# Patient Record
Sex: MALE | Race: OTHER | NOT HISPANIC OR LATINO | URBAN - METROPOLITAN AREA
[De-identification: names, ages, dates, MRNs, and addresses within clinical notes are randomized per-mention and may not be internally consistent; named-entity substitution may affect disease eponyms.]

---

## 2017-06-13 ENCOUNTER — OUTPATIENT (OUTPATIENT)
Dept: OUTPATIENT SERVICES | Facility: HOSPITAL | Age: 67
LOS: 1 days | Discharge: HOME | End: 2017-06-13

## 2017-06-13 DIAGNOSIS — I63.9 CEREBRAL INFARCTION, UNSPECIFIED: ICD-10-CM

## 2017-06-28 DIAGNOSIS — M25.572 PAIN IN LEFT ANKLE AND JOINTS OF LEFT FOOT: ICD-10-CM

## 2017-07-05 ENCOUNTER — OUTPATIENT (OUTPATIENT)
Dept: OUTPATIENT SERVICES | Facility: HOSPITAL | Age: 67
LOS: 1 days | Discharge: HOME | End: 2017-07-05

## 2017-07-05 DIAGNOSIS — R52 PAIN, UNSPECIFIED: ICD-10-CM

## 2017-07-05 DIAGNOSIS — I63.9 CEREBRAL INFARCTION, UNSPECIFIED: ICD-10-CM

## 2017-07-27 ENCOUNTER — OUTPATIENT (OUTPATIENT)
Dept: OUTPATIENT SERVICES | Facility: HOSPITAL | Age: 67
LOS: 1 days | Discharge: HOME | End: 2017-07-27

## 2017-07-27 DIAGNOSIS — R93.8 ABNORMAL FINDINGS ON DIAGNOSTIC IMAGING OF OTHER SPECIFIED BODY STRUCTURES: ICD-10-CM

## 2017-07-27 DIAGNOSIS — I63.9 CEREBRAL INFARCTION, UNSPECIFIED: ICD-10-CM

## 2018-01-03 ENCOUNTER — OUTPATIENT (OUTPATIENT)
Dept: OUTPATIENT SERVICES | Facility: HOSPITAL | Age: 68
LOS: 1 days | Discharge: HOME | End: 2018-01-03

## 2018-01-03 DIAGNOSIS — R49.8 OTHER VOICE AND RESONANCE DISORDERS: ICD-10-CM

## 2018-01-03 DIAGNOSIS — I63.9 CEREBRAL INFARCTION, UNSPECIFIED: ICD-10-CM

## 2018-04-04 ENCOUNTER — TRANSCRIPTION ENCOUNTER (OUTPATIENT)
Age: 68
End: 2018-04-04

## 2018-12-28 ENCOUNTER — OUTPATIENT (OUTPATIENT)
Dept: OUTPATIENT SERVICES | Facility: HOSPITAL | Age: 68
LOS: 1 days | Discharge: HOME | End: 2018-12-28

## 2018-12-28 DIAGNOSIS — R97.0 ELEVATED CARCINOEMBRYONIC ANTIGEN [CEA]: ICD-10-CM

## 2019-03-29 ENCOUNTER — OUTPATIENT (OUTPATIENT)
Dept: OUTPATIENT SERVICES | Facility: HOSPITAL | Age: 69
LOS: 1 days | Discharge: HOME | End: 2019-03-29

## 2019-03-29 DIAGNOSIS — R97.20 ELEVATED PROSTATE SPECIFIC ANTIGEN [PSA]: ICD-10-CM

## 2019-08-23 ENCOUNTER — OUTPATIENT (OUTPATIENT)
Dept: OUTPATIENT SERVICES | Facility: HOSPITAL | Age: 69
LOS: 1 days | Discharge: HOME | End: 2019-08-23

## 2019-08-23 DIAGNOSIS — R97.20 ELEVATED PROSTATE SPECIFIC ANTIGEN [PSA]: ICD-10-CM

## 2020-12-22 ENCOUNTER — OUTPATIENT (OUTPATIENT)
Dept: OUTPATIENT SERVICES | Facility: HOSPITAL | Age: 70
LOS: 1 days | Discharge: HOME | End: 2020-12-22
Payer: MEDICAID

## 2020-12-22 DIAGNOSIS — M25.511 PAIN IN RIGHT SHOULDER: ICD-10-CM

## 2020-12-22 PROCEDURE — 73030 X-RAY EXAM OF SHOULDER: CPT | Mod: 26,RT

## 2021-04-26 ENCOUNTER — TRANSCRIPTION ENCOUNTER (OUTPATIENT)
Age: 71
End: 2021-04-26

## 2021-11-07 ENCOUNTER — TRANSCRIPTION ENCOUNTER (OUTPATIENT)
Age: 71
End: 2021-11-07

## 2022-09-30 ENCOUNTER — OUTPATIENT (OUTPATIENT)
Dept: OUTPATIENT SERVICES | Facility: HOSPITAL | Age: 72
LOS: 1 days | Discharge: HOME | End: 2022-09-30

## 2022-09-30 DIAGNOSIS — M25.561 PAIN IN RIGHT KNEE: ICD-10-CM

## 2022-09-30 DIAGNOSIS — M25.511 PAIN IN RIGHT SHOULDER: ICD-10-CM

## 2022-09-30 PROCEDURE — 73721 MRI JNT OF LWR EXTRE W/O DYE: CPT | Mod: 26,RT

## 2022-09-30 PROCEDURE — 73221 MRI JOINT UPR EXTREM W/O DYE: CPT | Mod: 26,RT

## 2023-02-12 ENCOUNTER — INPATIENT (INPATIENT)
Facility: HOSPITAL | Age: 73
LOS: 3 days | Discharge: HOME | DRG: 554 | End: 2023-02-16
Attending: INTERNAL MEDICINE | Admitting: INTERNAL MEDICINE
Payer: MEDICARE

## 2023-02-12 VITALS
DIASTOLIC BLOOD PRESSURE: 60 MMHG | TEMPERATURE: 96 F | WEIGHT: 175.05 LBS | RESPIRATION RATE: 18 BRPM | SYSTOLIC BLOOD PRESSURE: 130 MMHG | OXYGEN SATURATION: 96 % | HEART RATE: 80 BPM

## 2023-02-12 DIAGNOSIS — M00.9 PYOGENIC ARTHRITIS, UNSPECIFIED: ICD-10-CM

## 2023-02-12 LAB
ALBUMIN SERPL ELPH-MCNC: 4 G/DL — SIGNIFICANT CHANGE UP (ref 3.5–5.2)
ALP SERPL-CCNC: 62 U/L — SIGNIFICANT CHANGE UP (ref 30–115)
ALT FLD-CCNC: 10 U/L — SIGNIFICANT CHANGE UP (ref 0–41)
ANION GAP SERPL CALC-SCNC: 15 MMOL/L — HIGH (ref 7–14)
APPEARANCE UR: CLEAR — SIGNIFICANT CHANGE UP
APTT BLD: 27.3 SEC — SIGNIFICANT CHANGE UP (ref 27–39.2)
AST SERPL-CCNC: 30 U/L — SIGNIFICANT CHANGE UP (ref 0–41)
B PERT IGG+IGM PNL SER: ABNORMAL
BACTERIA # UR AUTO: NEGATIVE — SIGNIFICANT CHANGE UP
BASOPHILS # BLD AUTO: 0.03 K/UL — SIGNIFICANT CHANGE UP (ref 0–0.2)
BASOPHILS NFR BLD AUTO: 0.3 % — SIGNIFICANT CHANGE UP (ref 0–1)
BILIRUB SERPL-MCNC: 1.7 MG/DL — HIGH (ref 0.2–1.2)
BILIRUB UR-MCNC: NEGATIVE — SIGNIFICANT CHANGE UP
BUN SERPL-MCNC: 13 MG/DL — SIGNIFICANT CHANGE UP (ref 10–20)
CALCIUM SERPL-MCNC: 9.3 MG/DL — SIGNIFICANT CHANGE UP (ref 8.4–10.5)
CHLORIDE SERPL-SCNC: 94 MMOL/L — LOW (ref 98–110)
CO2 SERPL-SCNC: 25 MMOL/L — SIGNIFICANT CHANGE UP (ref 17–32)
COLOR FLD: YELLOW — SIGNIFICANT CHANGE UP
COLOR SPEC: YELLOW — SIGNIFICANT CHANGE UP
CREAT SERPL-MCNC: 0.9 MG/DL — SIGNIFICANT CHANGE UP (ref 0.7–1.5)
CRP SERPL-MCNC: 229 MG/L — HIGH
DIFF PNL FLD: ABNORMAL
EGFR: 91 ML/MIN/1.73M2 — SIGNIFICANT CHANGE UP
EOSINOPHIL # BLD AUTO: 0.03 K/UL — SIGNIFICANT CHANGE UP (ref 0–0.7)
EOSINOPHIL NFR BLD AUTO: 0.3 % — SIGNIFICANT CHANGE UP (ref 0–8)
EPI CELLS # UR: 2 /HPF — SIGNIFICANT CHANGE UP (ref 0–5)
ERYTHROCYTE [SEDIMENTATION RATE] IN BLOOD: 45 MM/HR — HIGH (ref 0–10)
FLUAV AG NPH QL: SIGNIFICANT CHANGE UP
FLUBV AG NPH QL: SIGNIFICANT CHANGE UP
FLUID INTAKE SUBSTANCE CLASS: SIGNIFICANT CHANGE UP
GLUCOSE FLD-MCNC: 118 MG/DL — SIGNIFICANT CHANGE UP
GLUCOSE SERPL-MCNC: 163 MG/DL — HIGH (ref 70–99)
GLUCOSE UR QL: NEGATIVE — SIGNIFICANT CHANGE UP
GRAM STN FLD: SIGNIFICANT CHANGE UP
HCT VFR BLD CALC: 43.1 % — SIGNIFICANT CHANGE UP (ref 42–52)
HGB BLD-MCNC: 15 G/DL — SIGNIFICANT CHANGE UP (ref 14–18)
HYALINE CASTS # UR AUTO: 2 /LPF — SIGNIFICANT CHANGE UP (ref 0–7)
IMM GRANULOCYTES NFR BLD AUTO: 0.4 % — HIGH (ref 0.1–0.3)
INR BLD: 1.08 RATIO — SIGNIFICANT CHANGE UP (ref 0.65–1.3)
KETONES UR-MCNC: SIGNIFICANT CHANGE UP
LACTATE SERPL-SCNC: 1.8 MMOL/L — SIGNIFICANT CHANGE UP (ref 0.7–2)
LEUKOCYTE ESTERASE UR-ACNC: NEGATIVE — SIGNIFICANT CHANGE UP
LYMPHOCYTES # BLD AUTO: 1.44 K/UL — SIGNIFICANT CHANGE UP (ref 1.2–3.4)
LYMPHOCYTES # BLD AUTO: 15.5 % — LOW (ref 20.5–51.1)
MCHC RBC-ENTMCNC: 30 PG — SIGNIFICANT CHANGE UP (ref 27–31)
MCHC RBC-ENTMCNC: 34.8 G/DL — SIGNIFICANT CHANGE UP (ref 32–37)
MCV RBC AUTO: 86.2 FL — SIGNIFICANT CHANGE UP (ref 80–94)
MONOCYTES # BLD AUTO: 1.06 K/UL — HIGH (ref 0.1–0.6)
MONOCYTES NFR BLD AUTO: 11.4 % — HIGH (ref 1.7–9.3)
MONOS+MACROS # FLD: 10 % — SIGNIFICANT CHANGE UP
NEUTROPHILS # BLD AUTO: 6.72 K/UL — HIGH (ref 1.4–6.5)
NEUTROPHILS NFR BLD AUTO: 72.1 % — SIGNIFICANT CHANGE UP (ref 42.2–75.2)
NEUTROPHILS-BODY FLUID: 90 % — SIGNIFICANT CHANGE UP
NITRITE UR-MCNC: NEGATIVE — SIGNIFICANT CHANGE UP
NRBC # BLD: 0 /100 WBCS — SIGNIFICANT CHANGE UP (ref 0–0)
PH UR: 6.5 — SIGNIFICANT CHANGE UP (ref 5–8)
PLATELET # BLD AUTO: 172 K/UL — SIGNIFICANT CHANGE UP (ref 130–400)
POTASSIUM SERPL-MCNC: 4 MMOL/L — SIGNIFICANT CHANGE UP (ref 3.5–5)
POTASSIUM SERPL-SCNC: 4 MMOL/L — SIGNIFICANT CHANGE UP (ref 3.5–5)
PROT FLD-MCNC: 3.6 G/DL — SIGNIFICANT CHANGE UP
PROT SERPL-MCNC: 6.7 G/DL — SIGNIFICANT CHANGE UP (ref 6–8)
PROT UR-MCNC: ABNORMAL
PROTHROM AB SERPL-ACNC: 12.3 SEC — SIGNIFICANT CHANGE UP (ref 9.95–12.87)
RBC # BLD: 5 M/UL — SIGNIFICANT CHANGE UP (ref 4.7–6.1)
RBC # FLD: 12.3 % — SIGNIFICANT CHANGE UP (ref 11.5–14.5)
RBC CASTS # UR COMP ASSIST: 18 /HPF — HIGH (ref 0–4)
RCV VOL RI: 7000 /UL — HIGH (ref 0–0)
RSV RNA NPH QL NAA+NON-PROBE: SIGNIFICANT CHANGE UP
SARS-COV-2 RNA SPEC QL NAA+PROBE: SIGNIFICANT CHANGE UP
SODIUM SERPL-SCNC: 134 MMOL/L — LOW (ref 135–146)
SP GR SPEC: 1.03 — SIGNIFICANT CHANGE UP (ref 1.01–1.03)
TOTAL NUCLEATED CELL COUNT, BODY FLUID: SIGNIFICANT CHANGE UP /UL
TUBE TYPE: SIGNIFICANT CHANGE UP
UROBILINOGEN FLD QL: ABNORMAL
WBC # BLD: 9.32 K/UL — SIGNIFICANT CHANGE UP (ref 4.8–10.8)
WBC # FLD AUTO: 9.32 K/UL — SIGNIFICANT CHANGE UP (ref 4.8–10.8)
WBC UR QL: 6 /HPF — HIGH (ref 0–5)

## 2023-02-12 PROCEDURE — 84100 ASSAY OF PHOSPHORUS: CPT

## 2023-02-12 PROCEDURE — 80053 COMPREHEN METABOLIC PANEL: CPT

## 2023-02-12 PROCEDURE — 86803 HEPATITIS C AB TEST: CPT

## 2023-02-12 PROCEDURE — 73552 X-RAY EXAM OF FEMUR 2/>: CPT | Mod: 26,RT

## 2023-02-12 PROCEDURE — 71045 X-RAY EXAM CHEST 1 VIEW: CPT | Mod: 26

## 2023-02-12 PROCEDURE — 36415 COLL VENOUS BLD VENIPUNCTURE: CPT

## 2023-02-12 PROCEDURE — 84550 ASSAY OF BLOOD/URIC ACID: CPT

## 2023-02-12 PROCEDURE — 80202 ASSAY OF VANCOMYCIN: CPT

## 2023-02-12 PROCEDURE — 83036 HEMOGLOBIN GLYCOSYLATED A1C: CPT

## 2023-02-12 PROCEDURE — 80061 LIPID PANEL: CPT

## 2023-02-12 PROCEDURE — 84443 ASSAY THYROID STIM HORMONE: CPT

## 2023-02-12 PROCEDURE — 82962 GLUCOSE BLOOD TEST: CPT

## 2023-02-12 PROCEDURE — 83735 ASSAY OF MAGNESIUM: CPT

## 2023-02-12 PROCEDURE — 85025 COMPLETE CBC W/AUTO DIFF WBC: CPT

## 2023-02-12 PROCEDURE — 97162 PT EVAL MOD COMPLEX 30 MIN: CPT | Mod: GP

## 2023-02-12 PROCEDURE — 93010 ELECTROCARDIOGRAM REPORT: CPT

## 2023-02-12 PROCEDURE — 99223 1ST HOSP IP/OBS HIGH 75: CPT

## 2023-02-12 PROCEDURE — 85730 THROMBOPLASTIN TIME PARTIAL: CPT

## 2023-02-12 PROCEDURE — 85027 COMPLETE CBC AUTOMATED: CPT

## 2023-02-12 PROCEDURE — 73562 X-RAY EXAM OF KNEE 3: CPT | Mod: 26,RT

## 2023-02-12 PROCEDURE — 73590 X-RAY EXAM OF LOWER LEG: CPT | Mod: 26,RT

## 2023-02-12 PROCEDURE — 93306 TTE W/DOPPLER COMPLETE: CPT

## 2023-02-12 PROCEDURE — 86140 C-REACTIVE PROTEIN: CPT

## 2023-02-12 RX ORDER — TAMSULOSIN HYDROCHLORIDE 0.4 MG/1
0.4 CAPSULE ORAL AT BEDTIME
Refills: 0 | Status: DISCONTINUED | OUTPATIENT
Start: 2023-02-12 | End: 2023-02-16

## 2023-02-12 RX ORDER — FINASTERIDE 5 MG/1
5 TABLET, FILM COATED ORAL DAILY
Refills: 0 | Status: DISCONTINUED | OUTPATIENT
Start: 2023-02-12 | End: 2023-02-16

## 2023-02-12 RX ORDER — BACLOFEN 100 %
5 POWDER (GRAM) MISCELLANEOUS EVERY 8 HOURS
Refills: 0 | Status: DISCONTINUED | OUTPATIENT
Start: 2023-02-12 | End: 2023-02-14

## 2023-02-12 RX ORDER — CARBIDOPA AND LEVODOPA 25; 100 MG/1; MG/1
1 TABLET ORAL THREE TIMES A DAY
Refills: 0 | Status: DISCONTINUED | OUTPATIENT
Start: 2023-02-12 | End: 2023-02-14

## 2023-02-12 RX ORDER — DULOXETINE HYDROCHLORIDE 30 MG/1
30 CAPSULE, DELAYED RELEASE ORAL DAILY
Refills: 0 | Status: DISCONTINUED | OUTPATIENT
Start: 2023-02-12 | End: 2023-02-16

## 2023-02-12 RX ORDER — OXYBUTYNIN CHLORIDE 5 MG
5 TABLET ORAL THREE TIMES A DAY
Refills: 0 | Status: DISCONTINUED | OUTPATIENT
Start: 2023-02-12 | End: 2023-02-16

## 2023-02-12 RX ORDER — VANCOMYCIN HCL 1 G
1000 VIAL (EA) INTRAVENOUS ONCE
Refills: 0 | Status: COMPLETED | OUTPATIENT
Start: 2023-02-12 | End: 2023-02-12

## 2023-02-12 RX ORDER — SODIUM CHLORIDE 9 MG/ML
1000 INJECTION, SOLUTION INTRAVENOUS
Refills: 0 | Status: DISCONTINUED | OUTPATIENT
Start: 2023-02-12 | End: 2023-02-13

## 2023-02-12 RX ORDER — PIPERACILLIN AND TAZOBACTAM 4; .5 G/20ML; G/20ML
3.38 INJECTION, POWDER, LYOPHILIZED, FOR SOLUTION INTRAVENOUS ONCE
Refills: 0 | Status: COMPLETED | OUTPATIENT
Start: 2023-02-12 | End: 2023-02-12

## 2023-02-12 RX ORDER — ATORVASTATIN CALCIUM 80 MG/1
20 TABLET, FILM COATED ORAL AT BEDTIME
Refills: 0 | Status: DISCONTINUED | OUTPATIENT
Start: 2023-02-12 | End: 2023-02-16

## 2023-02-12 RX ORDER — ENOXAPARIN SODIUM 100 MG/ML
40 INJECTION SUBCUTANEOUS EVERY 24 HOURS
Refills: 0 | Status: DISCONTINUED | OUTPATIENT
Start: 2023-02-12 | End: 2023-02-16

## 2023-02-12 RX ORDER — KETOROLAC TROMETHAMINE 30 MG/ML
30 SYRINGE (ML) INJECTION ONCE
Refills: 0 | Status: DISCONTINUED | OUTPATIENT
Start: 2023-02-12 | End: 2023-02-12

## 2023-02-12 RX ORDER — PANTOPRAZOLE SODIUM 20 MG/1
40 TABLET, DELAYED RELEASE ORAL
Refills: 0 | Status: DISCONTINUED | OUTPATIENT
Start: 2023-02-12 | End: 2023-02-16

## 2023-02-12 RX ORDER — OXYCODONE AND ACETAMINOPHEN 5; 325 MG/1; MG/1
2 TABLET ORAL ONCE
Refills: 0 | Status: DISCONTINUED | OUTPATIENT
Start: 2023-02-12 | End: 2023-02-12

## 2023-02-12 RX ORDER — SODIUM CHLORIDE 9 MG/ML
1000 INJECTION, SOLUTION INTRAVENOUS ONCE
Refills: 0 | Status: COMPLETED | OUTPATIENT
Start: 2023-02-12 | End: 2023-02-12

## 2023-02-12 RX ORDER — ROPINIROLE 8 MG/1
1 TABLET, FILM COATED, EXTENDED RELEASE ORAL THREE TIMES A DAY
Refills: 0 | Status: DISCONTINUED | OUTPATIENT
Start: 2023-02-12 | End: 2023-02-16

## 2023-02-12 RX ORDER — ATENOLOL 25 MG/1
100 TABLET ORAL DAILY
Refills: 0 | Status: DISCONTINUED | OUTPATIENT
Start: 2023-02-12 | End: 2023-02-16

## 2023-02-12 RX ADMIN — OXYCODONE AND ACETAMINOPHEN 2 TABLET(S): 5; 325 TABLET ORAL at 12:04

## 2023-02-12 RX ADMIN — ROPINIROLE 1 MILLIGRAM(S): 8 TABLET, FILM COATED, EXTENDED RELEASE ORAL at 22:29

## 2023-02-12 RX ADMIN — CARBIDOPA AND LEVODOPA 1 TABLET(S): 25; 100 TABLET ORAL at 22:29

## 2023-02-12 RX ADMIN — Medication 250 MILLIGRAM(S): at 13:56

## 2023-02-12 RX ADMIN — ATORVASTATIN CALCIUM 20 MILLIGRAM(S): 80 TABLET, FILM COATED ORAL at 22:29

## 2023-02-12 RX ADMIN — Medication 5 MILLIGRAM(S): at 22:28

## 2023-02-12 RX ADMIN — TAMSULOSIN HYDROCHLORIDE 0.4 MILLIGRAM(S): 0.4 CAPSULE ORAL at 22:28

## 2023-02-12 RX ADMIN — PIPERACILLIN AND TAZOBACTAM 200 GRAM(S): 4; .5 INJECTION, POWDER, LYOPHILIZED, FOR SOLUTION INTRAVENOUS at 13:56

## 2023-02-12 RX ADMIN — Medication 5 MILLIGRAM(S): at 22:29

## 2023-02-12 RX ADMIN — SODIUM CHLORIDE 1000 MILLILITER(S): 9 INJECTION, SOLUTION INTRAVENOUS at 10:38

## 2023-02-12 RX ADMIN — Medication 30 MILLIGRAM(S): at 10:38

## 2023-02-12 RX ADMIN — SODIUM CHLORIDE 75 MILLILITER(S): 9 INJECTION, SOLUTION INTRAVENOUS at 18:32

## 2023-02-12 NOTE — H&P ADULT - NSHPREVIEWOFSYSTEMS_GEN_ALL_CORE
See HPI Review of Systems:  •	CONSTITUTIONAL - No fever  •	SKIN - No rash  •	HEMATOLOGIC - No abnormal bleeding or bruising  •	RESPIRATORY - No shortness of breath, No cough  •	CARDIAC -No chest pain  •	GI - No abdominal pain  •                 - No dysuria   •	ENDO - No polydypsia, No polyuria, No heat/cold intolerance  •	MUSCULOSKELETAL - Right knee Pain s/p Arthrocentesis   All other systems negative, unless specified in HPI

## 2023-02-12 NOTE — CONSULT NOTE ADULT - SUBJECTIVE AND OBJECTIVE BOX
Orthopaedic Surgery Consult Note    74yo Male presents to ED with right knee pain after injection 4 days prior. No fevers or chills. Pain localized to right knee. No pain elsewhere. No numbness or tingling of the extremities. Knee aspiration performed by ED, cell count <50k    PMH/PSH  PAIN  Ambulatory dysfunction    Medications  atenolol  Tablet 100 milliGRAM(s) Oral daily  atorvastatin 20 milliGRAM(s) Oral at bedtime  baclofen 5 milliGRAM(s) Oral every 8 hours  carbidopa/levodopa  25/250 1 Tablet(s) Oral three times a day  DULoxetine 30 milliGRAM(s) Oral daily  enoxaparin Injectable 40 milliGRAM(s) SubCutaneous every 24 hours  finasteride 5 milliGRAM(s) Oral daily  hydrochlorothiazide 12.5 milliGRAM(s) Oral daily  lactated ringers. 1000 milliLiter(s) IV Continuous <Continuous>  oxybutynin 5 milliGRAM(s) Oral three times a day  pantoprazole    Tablet 40 milliGRAM(s) Oral before breakfast  rOPINIRole 1 milliGRAM(s) Oral three times a day  tamsulosin 0.4 milliGRAM(s) Oral at bedtime    Home Medications:  atenolol 100 mg oral tablet: 1 tab(s) orally once a day (12 Feb 2023 15:44)  atorvastatin 20 mg oral tablet: 1 tab(s) orally once a day (12 Feb 2023 15:44)  baclofen 20 mg oral tablet: 1 tab(s) orally 3 times a day (12 Feb 2023 15:44)  DULoxetine 30 mg oral delayed release capsule: 1 cap(s) orally 2 times a day (12 Feb 2023 15:45)  finasteride 5 mg oral tablet: 1 tab(s) orally once a day (12 Feb 2023 15:45)  hydroCHLOROthiazide 12.5 mg oral tablet: 1 tab(s) orally once a day (12 Feb 2023 15:46)  omeprazole 20 mg oral delayed release capsule: 1 cap(s) orally once a day (12 Feb 2023 15:46)  oxybutynin 5 mg oral tablet: 1 tab(s) orally 3 times a day (12 Feb 2023 15:46)  rOPINIRole 1 mg oral tablet: 1 tab(s) orally 3 times a day (12 Feb 2023 15:46)  Sinemet 25 mg-250 mg oral tablet: 1 tab(s) orally 3 times a day (12 Feb 2023 15:45)  tamsulosin 0.4 mg oral capsule: 1 cap(s) orally once a day (12 Feb 2023 15:46)  tiZANidine 2 mg oral tablet: 0.5 tab(s) orally every 8 hours (12 Feb 2023 15:47)    Allergies  No Known Drug Allergies    T(C): 36.8 (02-12-23 @ 22:05), Max: 36.9 (02-12-23 @ 18:29)  HR: 74 (02-12-23 @ 22:05) (68 - 80)  BP: 133/67 (02-12-23 @ 22:05) (122/64 - 133/67)  RR: 18 (02-12-23 @ 22:05) (18 - 19)  SpO2: 97% (02-12-23 @ 18:29) (96% - 97%)    P/E:  AOx3, NAD  Non-labored breathing    RLE  Skin intact  Minimal knee swelling  No erythema/ecchymosis noted  No deformity/laceration/abrasion noted  ROM 5-90, no pain with short arc ROM or micromotion  TTP anterior knee, no TTP medial/lateral joint line  Compartments soft and compressible, no pain w/ passive stretch of digits  SILT SP/DP/T/SURAL/  Firing TA/EHL/FHL/GS  DP pulse 2+, cap refill <2        Labs                        15.0   9.32  )-----------( 172      ( 12 Feb 2023 11:16 )             43.1     02-12    134<L>  |  94<L>  |  13  ----------------------------<  163<H>  4.0   |  25  |  0.9    Ca    9.3      12 Feb 2023 11:16    TPro  6.7  /  Alb  4.0  /  TBili  1.7<H>  /  DBili  x   /  AST  30  /  ALT  10  /  AlkPhos  62  02-12    LIVER FUNCTIONS - ( 12 Feb 2023 11:16 )  Alb: 4.0 g/dL / Pro: 6.7 g/dL / ALK PHOS: 62 U/L / ALT: 10 U/L / AST: 30 U/L / GGT: x           PT/INR - ( 12 Feb 2023 11:16 )   PT: 12.30 sec;   INR: 1.08 ratio         PTT - ( 12 Feb 2023 11:16 )  PTT:27.3 sec    Imaging: XR demonstrating R knee degenerative changes    A/P:  74yo Male w/ right knee pain/inflammation, possible gout vs. inflammatory arthropathy. Cell count 38,481, no organisms on gram stain. Pain much improved after aspiration and abx. Low clinical suspicion for septic joint; no acute orthopedic surgical intervention, will continue to monitor, may consider serial aspiration    Weight bearing: WBAT RLE  Pain control  Ice/elevation  Diet as tolerated  DVT ppx  Abx per ID  OOBTC  Home medications  Monitor labs  Follow up knee aspiration culture and crystals  PT/OT/Rehab

## 2023-02-12 NOTE — ED PROVIDER NOTE - CARE PLAN
1 Principal Discharge DX:	Septic joint of right knee joint   Principal Discharge DX:	Septic joint of right knee joint  Secondary Diagnosis:	Ambulatory dysfunction

## 2023-02-12 NOTE — H&P ADULT - NSHPLABSRESULTS_GEN_ALL_CORE
LABS:  cret                        15.0   9.32  )-----------( 172      ( 12 Feb 2023 11:16 )             43.1     02-12    134<L>  |  94<L>  |  13  ----------------------------<  163<H>  4.0   |  25  |  0.9    Ca    9.3      12 Feb 2023 11:16    TPro  6.7  /  Alb  4.0  /  TBili  1.7<H>  /  DBili  x   /  AST  30  /  ALT  10  /  AlkPhos  62  02-12    PT/INR - ( 12 Feb 2023 11:16 )   PT: 12.30 sec;   INR: 1.08 ratio         PTT - ( 12 Feb 2023 11:16 )  PTT:27.3 sec

## 2023-02-12 NOTE — H&P ADULT - ATTENDING COMMENTS
73-year-old male with history of hypertension, hyperlipidemia, CVA with left-sided weakness, on aspirin, chronic right knee pain with monthly injections by Dr. Lehman of pain management, presents with worsening right knee pain, S/p recnt  Knee Injection OP  now Suspected  of having Septic Joint s/p Joint Aspiration.    Please confirm Medication in AM    IMPRESSION   Suspected Septic Joint   > Recent OP Procedure  states the pain management doctor usually injects him on the medial aspect of his knee, however on Wednesday for the first time he injected him on the lateral aspect.  > Pain  worsened  After  procedure , ESR 45  CRP elevated   > C/w Abx  S/p Vanc and zozyn in the ED   f/u Joint fluid aspiration  and imaging   f/u ID   Hx  Parkinson's Dementia with  hx CVA  with Residual Left sided weakness   > C/w sinemet and roprinole, Duloxetine   >C/w baclofen for muscle rigidity  >   PT Rehab   Hx HTN - Continue home medication Unless Contraindicated   Hx DLD- Continue home medication Unless Contraindicated 73-year-old male with history of hypertension, hyperlipidemia, CVA with left-sided weakness, on aspirin, chronic right knee pain with monthly injections by Dr. Lehman of pain management, presents with worsening right knee pain, S/p recnt  Knee Injection OP  now Suspected  of having Septic Joint s/p Joint Aspiration.    Please confirm Medication in AM    VITAL SIGNS: AFebrile, vital signs stable  CONSTITUTIONAL: Well-developed; well-nourished; in no acute distress.  SKIN: Skin exam is warm and dry, no acute rash.  HEAD: Normocephalic; atraumatic.  EYES: Pupils  reactive to light, Extraocular movements intact; conjunctiva and sclera clear.  ENT: No nasal discharge; airway clear. Moist mucus membranes.  NECK: Supple; non tender. No rigidity  CARD: Rregular rate and rhythm. Normal S1, S2; no murmurs, gallops, or rubs.  RESP: CT  auscultation bilaterally. No wheezes, rales or rhonchi.  ABD: Abdomen soft; non-tender; non-distended  EXT: Normal ROM. right knee tenderness, pain on passive movement    NEURO: Alert and oriented x 3. No focal deficits.  PSYCH: cooperative, appropriate.     IMPRESSION   Suspected Septic Joint   > Recent OP Procedure  states the pain management doctor usually injects him on the medial aspect of his knee, however on Wednesday for the first time he injected him on the lateral aspect.  > Pain  worsened  After  procedure , ESR 45  CRP elevated   > C/w Abx  S/p Vanc and zozyn in the ED   f/u Joint fluid aspiration  and imaging   f/u ID   Suspected  TONE - Will need OP w/u   Hx  Parkinson's Dementia with  hx CVA  with Residual Left sided weakness   > C/w sinemet and roprinole, Duloxetine   >C/w baclofen for muscle rigidity  >   PT Rehab   Hx HTN - Continue home medication Unless Contraindicated   Hx DLD- Continue home medication Unless Contraindicated    seen on 02/12/23

## 2023-02-12 NOTE — ED PROVIDER NOTE - INPATIENT RESIDENT/ACP NOTIFIED

## 2023-02-12 NOTE — ED PROVIDER NOTE - PHYSICAL EXAMINATION
CONSTITUTIONAL: well-appearing, in NAD  SKIN: Warm dry, normal skin turgor  HEAD: NCAT  EYES: EOMI, PERRLA, no scleral icterus, conjunctiva pink  ENT: normal pharynx with no erythema or exudates  NECK: Supple; non tender. Full ROM.  CARD: RRR, no murmurs.  RESP: clear to ausculation b/l. No crackles or wheezing.  ABD: soft, non-tender, non-distended, no rebound or guarding.  EXT: left sided strength 4/5 upper and lower ext, right knee swollen, hot, tender to touch with pain on passive flexion and extension, no pedal edema, no calf tenderness  NEURO: normal sensory. CN II-XII intact.  PSYCH: Cooperative, appropriate.

## 2023-02-12 NOTE — ED PROVIDER NOTE - NS ED MD TWO NIGHTS YN
Purchase OTC Mucinex DM for cough relief. Take ibuprofen for pain, order sent to pharmacy. Continue allegra. Continue home multi - symptom night cough syrup. You may need to see an allergist in the future. Managing Your Allergies: Care Instructions  Your Care Instructions  Managing your allergies is an important part of staying healthy. Your doctor will help you find out what may be causing the allergies. Common causes of allergy symptoms are house dust and dust mites, animal dander, mold, and pollen. As soon as you know what triggers your symptoms, try to reduce your exposure to your triggers. This can help prevent allergy symptoms, asthma, and other health problems. Ask your doctor about allergy medicine or immunotherapy. These treatments may help reduce or prevent allergy symptoms. Follow-up care is a key part of your treatment and safety. Be sure to make and go to all appointments, and call your doctor if you are having problems. It's also a good idea to know your test results and keep a list of the medicines you take. How can you care for yourself at home? · If you think that dust or dust mites are causing your allergies:  ¨ Wash sheets, pillowcases, and other bedding every week in hot water. ¨ Use airtight, dust-proof covers for pillows, duvets, and mattresses. Avoid plastic covers, because they tend to tear quickly and do not \"breathe. \" Wash according to the instructions. ¨ Remove extra blankets and pillows that you don't need. ¨ Use blankets that are machine-washable. ¨ Don't use home humidifiers. They can help mites live longer. · Use air-conditioning. Change or clean all filters every month. Keep windows closed. Use high-efficiency air filters. Don't use window or attic fans, which draw dust into the air. · If you're allergic to pet dander, keep pets outside or, at the very least, out of your bedroom. Old carpet and cloth-covered furniture can hold a lot of animal dander. You may need to replace them. · Look for signs of cockroaches. Use cockroach baits to get rid of them. Then clean your home well. · If you're allergic to mold, don't keep indoor plants, because molds can grow in soil. Get rid of furniture, rugs, and drapes that smell musty. Check for mold in the bathroom. · If you're allergic to pollen, stay inside when pollen counts are high. · Don't smoke or let anyone else smoke in your house. Don't use fireplaces or wood-burning stoves. Avoid paint fumes, perfumes, and other strong odors. When should you call for help? Give an epinephrine shot if:  · You think you are having a severe allergic reaction. · You have symptoms in more than one body area, such as mild nausea and an itchy mouth. After giving an epinephrine shot call 911, even if you feel better. Call 911 if:  · You have symptoms of a severe allergic reaction. These may include:  ¨ Sudden raised, red areas (hives) all over your body. ¨ Swelling of the throat, mouth, lips, or tongue. ¨ Trouble breathing. ¨ Passing out (losing consciousness). Or you may feel very lightheaded or suddenly feel weak, confused, or restless. · You have been given an epinephrine shot, even if you feel better. Call your doctor now or seek immediate medical care if:  · You have symptoms of an allergic reaction, such as:  ¨ A rash or hives (raised, red areas on the skin). ¨ Itching. ¨ Swelling. ¨ Belly pain, nausea, or vomiting. Watch closely for changes in your health, and be sure to contact your doctor if:  · Your allergies get worse. · You need help controlling your allergies. · You have questions about allergy testing. · You do not get better as expected. Where can you learn more? Go to http://jocelyn-sisi.info/. Enter L249 in the search box to learn more about \"Managing Your Allergies: Care Instructions. \"  Current as of: February 12, 2016  Content Version: 11.1  © 3242-5580 [employer]. Care instructions adapted under license by [employer] (which disclaims liability or warranty for this information). If you have questions about a medical condition or this instruction, always ask your healthcare professional. [address] any warranty or liability for your use of this information. Yes

## 2023-02-12 NOTE — ED PROVIDER NOTE - PROGRESS NOTE DETAILS
pk: labs reviewed, elevated esr crp, normal wbc count, knee tapped with purulent fluid, will send to lab and admit to medicine, abx started, ortho aware. pk: concern for septic joint, will obtain labs and tap knee

## 2023-02-12 NOTE — ED ADULT NURSE NOTE - OBJECTIVE STATEMENT
Patient is a 73 year old male complaining of right knee pain x4 days since getting injections in knee. As per patient he has been getting injections in knee and shoulder x6 years. Patient right knee warm to the touch and swollen

## 2023-02-12 NOTE — ED PROVIDER NOTE - CLINICAL SUMMARY MEDICAL DECISION MAKING FREE TEXT BOX
73-year-old male with history of hypertension, hyperlipidemia, CVA with left-sided weakness, on aspirin, chronic right knee pain with monthly injections by Dr. Lehman of pain management, presents with worsening right knee pain. He states the pain management doctor usually injects him on the medial aspect of his knee, however on Wednesday for the first time he injected him on the lateral aspect. He states the procedure itself was very painful and felt like he was hitting the bone. Since the procedure he has had significantly progressively worsening pain to the area. Reports subjective fever for the past 2 days, last took Tylenol this morning. Denies urinary changes, cough, chest pain, shortness of breath, vomiting, diarrhea, and all other symptoms. On exam, afebrile, hemodynamically stable, saturating well on room air, NAD, nontoxic appearing, appearance of chronic illness, laying comfortably in bed, no WOB, speaking full sentences, head NCAT, EOMI grossly, anicteric, MMM, RRR, nml S1/S2, no m/r/g, lungs CTAB, no w/r/r, abd soft, NT, ND, nml BS, no rebound or guarding, AAO, CN's 3-12 grossly intact, no hip TTP/step-off/deformity, mild right knee swelling with erythema with no overlying skin changes, no leg cyanosis or edema or calf tenderness, skin warm, well perfused, no rashes or hives, 2+ DP pulse. Patient with reported fevers and warmth and swelling to knee. Arthrocentesis performed due to concern for septic arthritis with purulent production. Started broad-spectrum antibiotics. Given analgesia. Patient nontoxic appearing, hemodynamically stable. Admitted to internal medicine for further monitoring, w/u, and care.

## 2023-02-12 NOTE — H&P ADULT - NSHPPHYSICALEXAM_GEN_ALL_CORE
VITALS:   T(C): 35.6 (02-12-23 @ 08:25), Max: 35.6 (02-12-23 @ 08:25)  HR: 80 (02-12-23 @ 08:25) (80 - 80)  BP: 130/60 (02-12-23 @ 08:25) (130/60 - 130/60)  RR: 18 (02-12-23 @ 08:25) (18 - 18)  SpO2: 96% (02-12-23 @ 08:25) (96% - 96%)    GENERAL: NAD, lying in bed comfortably  HEAD:  Atraumatic, normocephalic  EYES: EOMI, PERRLA, conjunctiva and sclera clear  ENT: Moist mucous membranes  NECK: Supple, no JVD  HEART: Regular rate and rhythm, no murmurs, rubs, or gallops  LUNGS: Unlabored respirations.  Clear to auscultation bilaterally, no crackles, wheezing, or rhonchi  ABDOMEN: Soft, nontender, nondistended, +BS  EXTREMITIES:swollen left knee, warm and erythematous. tender on palpation  NERVOUS SYSTEM:  A&Ox3, no focal deficits   SKIN: No rashes or lesions

## 2023-02-12 NOTE — PATIENT PROFILE ADULT - FALL HARM RISK - HARM RISK INTERVENTIONS

## 2023-02-12 NOTE — ED PROVIDER NOTE - PR
"Anesthesia Transfer of Care Note    Patient: Naila Huerta    Procedure(s) Performed: Procedure(s) (LRB):  ARTHROSCOPY, KNEE (Left)  MENISCECTOMY, KNEE, MEDIAL (Left)    Patient location: PACU    Anesthesia Type: general    Transport from OR: Transported from OR on room air with adequate spontaneous ventilation    Post pain: adequate analgesia    Post assessment: no apparent anesthetic complications    Post vital signs: stable    Level of consciousness: awake, alert and oriented    Nausea/Vomiting: no nausea/vomiting    Complications: none    Transfer of care protocol was followed      Last vitals:   Visit Vitals  /71 (BP Location: Right arm, Patient Position: Sitting)   Pulse 60   Temp 36.5 °C (97.7 °F) (Oral)   Resp 18   Ht 5' 7" (1.702 m)   Wt 65.8 kg (145 lb)   SpO2 98%   Breastfeeding? No   BMI 22.71 kg/m²     " 206

## 2023-02-12 NOTE — H&P ADULT - ASSESSMENT
73-year-old male with history of hypertension, hyperlipidemia, CVA with left-sided weakness, on aspirin, chronic right knee pain with monthly injections by Dr. Lehman of pain management, presents with worsening right knee pain.      # Knee Pain  # R/o septic arthritis  - states the pain management doctor usually injects him on the medial aspect of his knee, however on Wednesday for the first time he injected him on the lateral aspect.  -  He states the procedure itself was unusually very painful and felt like he was hitting the bone.   - Since the procedure he has had significantly progressively worsening pain to the area.   - Reports subjective fever for the past 2 days, last took Tylenol this morning.   - Knee joint was tapped in the ED. Cultures sent.   - ESR 45  - CRP elevated  - S/p vanc and zosyn in the ED  - C/w with antibiotics above  - ID consult  - Ortho consulted    # Parkinsons disease  # Hx of CVA with residual left sided weakness  - C/w sinemet and roprinole  - C/w baclofen for muscle rigidity  - Patient not on ASA or plavix ( please confirm home meds in AM)  - C/w duloxirtine  - PT eval  - Activity: IAT    # HTN  - C/w hydrochlorthiazide and atenolol    # DL  - C/w atorvastatin  - F/u lipid profile in AM    Diet: DASH  Activity: IAT  DVT Prophylaxis: lovenox  GI Prophylaxis: pantoprazole  CHG Order  Code Status: Full  Disposition: medicine   73-year-old male with history of hypertension, hyperlipidemia, CVA with left-sided weakness, on aspirin, chronic right knee pain with monthly injections by Dr. Lheman of pain management, presents with worsening right knee pain.      # Knee Pain  # R/o septic arthritis  - states the pain management doctor usually injects him on the medial aspect of his knee, however on Wednesday for the first time he injected him on the lateral aspect.  -  He states the procedure itself was unusually very painful and felt like he was hitting the bone.   - Since the procedure he has had significantly progressively worsening pain to the area.   - Reports subjective fever for the past 2 days, last took Tylenol this morning.   - Knee joint was tapped in the ED. Cultures sent.   - ESR 45  - CRP elevated  - S/p vanc and zosyn in the ED  - C/w with antibiotics above  - ID consult  - Ortho consulted  - Consider MRI left knee    # Parkinsons disease  # Hx of CVA with residual left sided weakness  - C/w sinemet and roprinole  - C/w baclofen for muscle rigidity  - Patient not on ASA or plavix ( please confirm home meds in AM)  - C/w duloxirtine  - PT eval  - Activity: IAT    # HTN  - C/w hydrochlorthiazide and atenolol    # DL  - C/w atorvastatin  - F/u lipid profile in AM    Diet: DASH  Activity: IAT  DVT Prophylaxis: lovenox  GI Prophylaxis: pantoprazole  CHG Order  Code Status: Full  Disposition: medicine

## 2023-02-12 NOTE — ED PROVIDER NOTE - OBJECTIVE STATEMENT
Patient is a 73 y male with PMHx Patient is a 73 y male with PMHx hypertension, hyperlipidemia, CVA with left-sided weakness, on aspirin, chronic right knee pain and right shoulder pain with monthly injections by Dr. Lehman of pain management, presents with worsening right knee pain. Pt states that he had an injection to his right shoulder and knee 4 days ago. Since then he has had worsening pain, swelling, to his right knee. ambulates with a walker and has been unable to do so today. denies fevers chills cp sob n/v/d abd pain back pain urinary complaints, trauma to the knee.

## 2023-02-12 NOTE — H&P ADULT - HISTORY OF PRESENT ILLNESS
73-year-old male with history of hypertension, hyperlipidemia, CVA with left-sided weakness, on aspirin, chronic right knee pain with monthly injections by Dr. Lehman of pain management, presents with worsening right knee pain. He states the pain management doctor usually injects him on the medial aspect of his knee, however on Wednesday for the first time he injected him on the lateral aspect. He states the procedure itself was very painful and felt like he was hitting the bone. Since the procedure he has had significantly progressively worsening pain to the area. Reports subjective fever for the past 2 days, last took Tylenol this morning. Denies urinary changes, cough, chest pain, shortness of breath, vomiting, diarrhea, and all other symptoms. On exam, afebrile, hemodynamically stable, saturating well on room air, NAD, nontoxic appearing, appearance of chronic illness.    Knee joint was tapped in the ED. Cultures sent. S/p vanc and zosyn.  Ortho consulted    Admit to medicine for possible septic arthritis  Please verify meds in AM     73-year-old male with history of hypertension, hyperlipidemia, CVA with left-sided weakness, on aspirin, chronic right knee pain with monthly injections by Dr. Lehman of pain management, presents with worsening right knee pain. He states the pain management doctor usually injects him on the medial aspect of his knee, however on Wednesday for the first time he injected him on the lateral aspect. He states the procedure itself was very painful and felt like he was hitting the bone. Since the procedure he has had significantly progressively worsening pain to the area. Reports subjective fever for the past 2 days, last took Tylenol this morning. Denies urinary changes, cough, chest pain, shortness of breath, vomiting, diarrhea, and all other symptoms. On exam, afebrile, hemodynamically stable, saturating well on room air, NAD, nontoxic appearing, appearance of chronic illness.    Knee joint was tapped in the ED. Cultures sent. S/p vanc and zosyn.  Ortho onboard as per ED    Admit to medicine for possible septic arthritis  Please verify meds in AM

## 2023-02-13 LAB
A1C WITH ESTIMATED AVERAGE GLUCOSE RESULT: 7.7 % — HIGH (ref 4–5.6)
ALBUMIN SERPL ELPH-MCNC: 3.5 G/DL — SIGNIFICANT CHANGE UP (ref 3.5–5.2)
ALP SERPL-CCNC: 50 U/L — SIGNIFICANT CHANGE UP (ref 30–115)
ALT FLD-CCNC: 6 U/L — SIGNIFICANT CHANGE UP (ref 0–41)
ANION GAP SERPL CALC-SCNC: 9 MMOL/L — SIGNIFICANT CHANGE UP (ref 7–14)
APTT BLD: 28.4 SEC — SIGNIFICANT CHANGE UP (ref 27–39.2)
AST SERPL-CCNC: 17 U/L — SIGNIFICANT CHANGE UP (ref 0–41)
BASOPHILS # BLD AUTO: 0.02 K/UL — SIGNIFICANT CHANGE UP (ref 0–0.2)
BASOPHILS NFR BLD AUTO: 0.3 % — SIGNIFICANT CHANGE UP (ref 0–1)
BILIRUB SERPL-MCNC: 1.3 MG/DL — HIGH (ref 0.2–1.2)
BUN SERPL-MCNC: 20 MG/DL — SIGNIFICANT CHANGE UP (ref 10–20)
CALCIUM SERPL-MCNC: 9 MG/DL — SIGNIFICANT CHANGE UP (ref 8.4–10.5)
CHLORIDE SERPL-SCNC: 100 MMOL/L — SIGNIFICANT CHANGE UP (ref 98–110)
CHOLEST SERPL-MCNC: 118 MG/DL — SIGNIFICANT CHANGE UP
CO2 SERPL-SCNC: 30 MMOL/L — SIGNIFICANT CHANGE UP (ref 17–32)
CREAT SERPL-MCNC: 0.9 MG/DL — SIGNIFICANT CHANGE UP (ref 0.7–1.5)
CRP SERPL-MCNC: 211.3 MG/L — HIGH
CULTURE RESULTS: SIGNIFICANT CHANGE UP
EGFR: 91 ML/MIN/1.73M2 — SIGNIFICANT CHANGE UP
EOSINOPHIL # BLD AUTO: 0.16 K/UL — SIGNIFICANT CHANGE UP (ref 0–0.7)
EOSINOPHIL NFR BLD AUTO: 2.2 % — SIGNIFICANT CHANGE UP (ref 0–8)
ESTIMATED AVERAGE GLUCOSE: 174 MG/DL — HIGH (ref 68–114)
GLUCOSE BLDC GLUCOMTR-MCNC: 151 MG/DL — HIGH (ref 70–99)
GLUCOSE BLDC GLUCOMTR-MCNC: 191 MG/DL — HIGH (ref 70–99)
GLUCOSE SERPL-MCNC: 163 MG/DL — HIGH (ref 70–99)
HCT VFR BLD CALC: 38.2 % — LOW (ref 42–52)
HCV AB S/CO SERPL IA: 0.04 COI — SIGNIFICANT CHANGE UP
HCV AB SERPL-IMP: SIGNIFICANT CHANGE UP
HDLC SERPL-MCNC: 44 MG/DL — SIGNIFICANT CHANGE UP
HGB BLD-MCNC: 13.2 G/DL — LOW (ref 14–18)
IMM GRANULOCYTES NFR BLD AUTO: 0.3 % — SIGNIFICANT CHANGE UP (ref 0.1–0.3)
LIPID PNL WITH DIRECT LDL SERPL: 58 MG/DL — SIGNIFICANT CHANGE UP
LYMPHOCYTES # BLD AUTO: 1.2 K/UL — SIGNIFICANT CHANGE UP (ref 1.2–3.4)
LYMPHOCYTES # BLD AUTO: 16.7 % — LOW (ref 20.5–51.1)
MAGNESIUM SERPL-MCNC: 1.8 MG/DL — SIGNIFICANT CHANGE UP (ref 1.8–2.4)
MCHC RBC-ENTMCNC: 30.3 PG — SIGNIFICANT CHANGE UP (ref 27–31)
MCHC RBC-ENTMCNC: 34.6 G/DL — SIGNIFICANT CHANGE UP (ref 32–37)
MCV RBC AUTO: 87.6 FL — SIGNIFICANT CHANGE UP (ref 80–94)
MONOCYTES # BLD AUTO: 0.86 K/UL — HIGH (ref 0.1–0.6)
MONOCYTES NFR BLD AUTO: 12 % — HIGH (ref 1.7–9.3)
NEUTROPHILS # BLD AUTO: 4.92 K/UL — SIGNIFICANT CHANGE UP (ref 1.4–6.5)
NEUTROPHILS NFR BLD AUTO: 68.5 % — SIGNIFICANT CHANGE UP (ref 42.2–75.2)
NON HDL CHOLESTEROL: 74 MG/DL — SIGNIFICANT CHANGE UP
NRBC # BLD: 0 /100 WBCS — SIGNIFICANT CHANGE UP (ref 0–0)
PHOSPHATE SERPL-MCNC: 2.5 MG/DL — SIGNIFICANT CHANGE UP (ref 2.1–4.9)
PLATELET # BLD AUTO: 170 K/UL — SIGNIFICANT CHANGE UP (ref 130–400)
POTASSIUM SERPL-MCNC: 4.3 MMOL/L — SIGNIFICANT CHANGE UP (ref 3.5–5)
POTASSIUM SERPL-SCNC: 4.3 MMOL/L — SIGNIFICANT CHANGE UP (ref 3.5–5)
PROT SERPL-MCNC: 5.8 G/DL — LOW (ref 6–8)
RBC # BLD: 4.36 M/UL — LOW (ref 4.7–6.1)
RBC # FLD: 12.2 % — SIGNIFICANT CHANGE UP (ref 11.5–14.5)
SODIUM SERPL-SCNC: 139 MMOL/L — SIGNIFICANT CHANGE UP (ref 135–146)
SPECIMEN SOURCE: SIGNIFICANT CHANGE UP
TRIGL SERPL-MCNC: 77 MG/DL — SIGNIFICANT CHANGE UP
TSH SERPL-MCNC: 0.79 UIU/ML — SIGNIFICANT CHANGE UP (ref 0.27–4.2)
URATE SERPL-MCNC: 4.7 MG/DL — SIGNIFICANT CHANGE UP (ref 3.4–8.8)
WBC # BLD: 7.18 K/UL — SIGNIFICANT CHANGE UP (ref 4.8–10.8)
WBC # FLD AUTO: 7.18 K/UL — SIGNIFICANT CHANGE UP (ref 4.8–10.8)

## 2023-02-13 PROCEDURE — 93306 TTE W/DOPPLER COMPLETE: CPT | Mod: 26

## 2023-02-13 PROCEDURE — 99232 SBSQ HOSP IP/OBS MODERATE 35: CPT

## 2023-02-13 RX ORDER — CEFEPIME 1 G/1
INJECTION, POWDER, FOR SOLUTION INTRAMUSCULAR; INTRAVENOUS
Refills: 0 | Status: DISCONTINUED | OUTPATIENT
Start: 2023-02-13 | End: 2023-02-13

## 2023-02-13 RX ORDER — CEFEPIME 1 G/1
2000 INJECTION, POWDER, FOR SOLUTION INTRAMUSCULAR; INTRAVENOUS EVERY 8 HOURS
Refills: 0 | Status: DISCONTINUED | OUTPATIENT
Start: 2023-02-13 | End: 2023-02-15

## 2023-02-13 RX ORDER — PIPERACILLIN AND TAZOBACTAM 4; .5 G/20ML; G/20ML
3.38 INJECTION, POWDER, LYOPHILIZED, FOR SOLUTION INTRAVENOUS EVERY 8 HOURS
Refills: 0 | Status: DISCONTINUED | OUTPATIENT
Start: 2023-02-13 | End: 2023-02-13

## 2023-02-13 RX ORDER — HALOPERIDOL DECANOATE 100 MG/ML
3 INJECTION INTRAMUSCULAR ONCE
Refills: 0 | Status: COMPLETED | OUTPATIENT
Start: 2023-02-13 | End: 2023-02-13

## 2023-02-13 RX ORDER — VANCOMYCIN HCL 1 G
1500 VIAL (EA) INTRAVENOUS EVERY 12 HOURS
Refills: 0 | Status: DISCONTINUED | OUTPATIENT
Start: 2023-02-13 | End: 2023-02-15

## 2023-02-13 RX ADMIN — DULOXETINE HYDROCHLORIDE 30 MILLIGRAM(S): 30 CAPSULE, DELAYED RELEASE ORAL at 13:13

## 2023-02-13 RX ADMIN — Medication 5 MILLIGRAM(S): at 06:10

## 2023-02-13 RX ADMIN — Medication 5 MILLIGRAM(S): at 06:11

## 2023-02-13 RX ADMIN — ATENOLOL 100 MILLIGRAM(S): 25 TABLET ORAL at 06:11

## 2023-02-13 RX ADMIN — CEFEPIME 100 MILLIGRAM(S): 1 INJECTION, POWDER, FOR SOLUTION INTRAMUSCULAR; INTRAVENOUS at 22:32

## 2023-02-13 RX ADMIN — CARBIDOPA AND LEVODOPA 1 TABLET(S): 25; 100 TABLET ORAL at 22:26

## 2023-02-13 RX ADMIN — Medication 5 MILLIGRAM(S): at 13:13

## 2023-02-13 RX ADMIN — FINASTERIDE 5 MILLIGRAM(S): 5 TABLET, FILM COATED ORAL at 13:13

## 2023-02-13 RX ADMIN — Medication 5 MILLIGRAM(S): at 13:12

## 2023-02-13 RX ADMIN — Medication 5 MILLIGRAM(S): at 22:27

## 2023-02-13 RX ADMIN — PIPERACILLIN AND TAZOBACTAM 25 GRAM(S): 4; .5 INJECTION, POWDER, LYOPHILIZED, FOR SOLUTION INTRAVENOUS at 13:15

## 2023-02-13 RX ADMIN — ENOXAPARIN SODIUM 40 MILLIGRAM(S): 100 INJECTION SUBCUTANEOUS at 06:12

## 2023-02-13 RX ADMIN — HALOPERIDOL DECANOATE 3 MILLIGRAM(S): 100 INJECTION INTRAMUSCULAR at 19:01

## 2023-02-13 RX ADMIN — CARBIDOPA AND LEVODOPA 1 TABLET(S): 25; 100 TABLET ORAL at 13:13

## 2023-02-13 RX ADMIN — ATORVASTATIN CALCIUM 20 MILLIGRAM(S): 80 TABLET, FILM COATED ORAL at 22:26

## 2023-02-13 RX ADMIN — CARBIDOPA AND LEVODOPA 1 TABLET(S): 25; 100 TABLET ORAL at 06:11

## 2023-02-13 RX ADMIN — ROPINIROLE 1 MILLIGRAM(S): 8 TABLET, FILM COATED, EXTENDED RELEASE ORAL at 06:11

## 2023-02-13 RX ADMIN — Medication 300 MILLIGRAM(S): at 18:02

## 2023-02-13 RX ADMIN — TAMSULOSIN HYDROCHLORIDE 0.4 MILLIGRAM(S): 0.4 CAPSULE ORAL at 22:26

## 2023-02-13 RX ADMIN — ROPINIROLE 1 MILLIGRAM(S): 8 TABLET, FILM COATED, EXTENDED RELEASE ORAL at 22:27

## 2023-02-13 RX ADMIN — ROPINIROLE 1 MILLIGRAM(S): 8 TABLET, FILM COATED, EXTENDED RELEASE ORAL at 13:12

## 2023-02-13 RX ADMIN — PANTOPRAZOLE SODIUM 40 MILLIGRAM(S): 20 TABLET, DELAYED RELEASE ORAL at 06:13

## 2023-02-13 NOTE — PROGRESS NOTE ADULT - ASSESSMENT
73-year-old male with history of hypertension, hyperlipidemia, CVA with left-sided weakness, on aspirin, chronic right knee pain with monthly injections by Dr. Lehman of pain management, presents with worsening right knee pain.    # Knee Pain  # R/o septic arthritis  - states the pain management doctor usually injects him on the medial aspect of his knee, however on Wednesday for the first time he injected him on the lateral aspect.  -  He states the procedure itself was unusually very painful and felt like he was hitting the bone.   - Since the procedure he has had significantly progressively worsening pain to the area.   - Reports subjective fever for the past 2 days, last took Tylenol this morning.   - Knee joint was tapped in the ED. Cultures sent.   - ESR 45  -   - cell count 38K  - gram stain -ve  - S/p vanc and zosyn in the ED  - C/w with Zosyn   - f/u on joint fluid culture  - f/u ID consult  - Ortho consulted: low clinical suspicion for septic arthritis  - Consider MRI left knee    # Parkinsons disease  # Hx of CVA with residual left sided weakness  - C/w sinemet and roprinole  - C/w baclofen for muscle rigidity  - Patient not on ASA or plavix  - C/w duloxirtine  - PT eval  - Activity: IAT    # HTN  - C/w hydrochlorthiazide and atenolol    # DL  - C/w atorvastatin  - F/u lipid profile in AM    Diet: DASH  Activity: IAT  DVT Prophylaxis: lovenox  GI Prophylaxis: pantoprazole  Code Status: Full  Disposition: medicine

## 2023-02-13 NOTE — CONSULT NOTE ADULT - SUBJECTIVE AND OBJECTIVE BOX
DEMETRICE LANDRUM  73y, Male  Allergy: [This allergen will not trigger allergy alert] Originally Entered as [Unknown] reaction to [nuts, shrimp, strawberry] (Unknown)  No Known Drug Allergies      All historical available data reviewed.    HPI:  73-year-old male with history of hypertension, hyperlipidemia, CVA with left-sided weakness, on aspirin, chronic right knee pain with monthly injections by Dr. Lehman of pain management, presents with worsening right knee pain. He states the pain management doctor usually injects him on the medial aspect of his knee, however on Wednesday for the first time he injected him on the lateral aspect. He states the procedure itself was very painful and felt like he was hitting the bone. Since the procedure he has had significantly progressively worsening pain to the area. Reports subjective fever for the past 2 days, last took Tylenol this morning. Denies urinary changes, cough, chest pain, shortness of breath, vomiting, diarrhea, and all other symptoms. On exam, afebrile, hemodynamically stable, saturating well on room air, NAD, nontoxic appearing, appearance of chronic illness     (2023 15:34)    FAMILY HISTORY:  No pertinent family history (Father, Mother)      PAST MEDICAL & SURGICAL HISTORY:  HTN (hypertension)      Dyslipidemia            VITALS:  T(F): 99.2, Max: 99.2 (23 @ 14:15)  HR: 106  BP: 133/72  RR: 18Vital Signs Last 24 Hrs  T(C): 37.3 (2023 14:15), Max: 37.3 (2023 14:15)  T(F): 99.2 (2023 14:15), Max: 99.2 (2023 14:15)  HR: 106 (2023 14:15) (68 - 106)  BP: 133/72 (2023 14:15) (122/64 - 133/72)  BP(mean): 94 (2023 18:29) (94 - 94)  RR: 18 (2023 14:15) (17 - 19)  SpO2: 97% (2023 18:29) (96% - 97%)    Parameters below as of 2023 18:29  Patient On (Oxygen Delivery Method): room air        TESTS & MEASUREMENTS:                        13.2   7.18  )-----------( 170      ( 2023 07:56 )             38.2     -    139  |  100  |  20  ----------------------------<  163<H>  4.3   |  30  |  0.9    Ca    9.0      2023 07:56  Phos  2.5       Mg     1.8         TPro  5.8<L>  /  Alb  3.5  /  TBili  1.3<H>  /  DBili  x   /  AST  17  /  ALT  6   /  AlkPhos  50      LIVER FUNCTIONS - ( 2023 07:56 )  Alb: 3.5 g/dL / Pro: 5.8 g/dL / ALK PHOS: 50 U/L / ALT: 6 U/L / AST: 17 U/L / GGT: x             Urinalysis Basic - ( 2023 12:19 )    Color: Yellow / Appearance: Clear / S.028 / pH: x  Gluc: x / Ketone: Trace  / Bili: Negative / Urobili: 3 mg/dL   Blood: x / Protein: 30 mg/dL / Nitrite: Negative   Leuk Esterase: Negative / RBC: 18 /HPF / WBC 6 /HPF   Sq Epi: x / Non Sq Epi: 2 /HPF / Bacteria: Negative          RADIOLOGY & ADDITIONAL TESTS:  Personal review of radiological diagnostics performed  Echo and EKG results noted when applicable.     MEDICATIONS:  atenolol  Tablet 100 milliGRAM(s) Oral daily  atorvastatin 20 milliGRAM(s) Oral at bedtime  baclofen 5 milliGRAM(s) Oral every 8 hours  carbidopa/levodopa  25/250 1 Tablet(s) Oral three times a day  DULoxetine 30 milliGRAM(s) Oral daily  enoxaparin Injectable 40 milliGRAM(s) SubCutaneous every 24 hours  finasteride 5 milliGRAM(s) Oral daily  hydrochlorothiazide 12.5 milliGRAM(s) Oral daily  oxybutynin 5 milliGRAM(s) Oral three times a day  oxycodone    5 mG/acetaminophen 325 mG 1 Tablet(s) Oral every 4 hours PRN  pantoprazole    Tablet 40 milliGRAM(s) Oral before breakfast  rOPINIRole 1 milliGRAM(s) Oral three times a day  tamsulosin 0.4 milliGRAM(s) Oral at bedtime      ANTIBIOTICS:

## 2023-02-13 NOTE — PROGRESS NOTE ADULT - ATTENDING COMMENTS
73-year-old male with history of hypertension, hyperlipidemia, CVA with left-sided hemiparesis, on aspirin, chronic right knee pain with monthly injections by Dr. Lehman of pain management, presents with worsening right knee pain.      # right knee pain and swellling to R/o septic arthritis  XR knee: No evidence of acute osseous abnormality.  - ESR 45,   - s/p arthrocentesis: cell count 38K  - gram stain -ve; cultures- pending; blood cultures: NGTD- monitor  - ID following- contiune vanco and cefepime; monitor vanc trough prior to 4 th dose    # Parkinsons disease  - C/w sinemet and roprinole, baclofen  - C/w duloxetine    # Hx of CVA with residual left sided hemiparesis  - Patient not on ASA or plavix    # HTN- C/w hydrochlorothiazide and atenolol    # DL- C/w atorvastatin    DVT Prophylaxis: lovenox    pending: fluid and blood cultures; vanco trough level, PT eval, ortho follow up  plan of care d/w patient and also with family member present at bedside

## 2023-02-13 NOTE — PROGRESS NOTE ADULT - ASSESSMENT
Orthopaedic Surgery Consult Note  ============================    ============================  Assessment:  74yo Male with  #Right knee pain    Likely secondary to inflammation, possible gout vs. inflammatory arthropathy.   Synovial Cell count 38,481, no organisms on gram stain. Culture and gram stain finalized/  Pain much improved after aspiration and abx. Low clinical suspicion for septic joint at this time    No acute orthopedic surgical intervention indicated at this time.       ============================  Plan:    Weight bearing: WBAT RLE  Pain control  Ice/elevation  Diet as tolerated  DVT ppx  Abx per ID  OOBTC  Home medications  Monitor labsPT/OT/Rehab

## 2023-02-14 LAB
ALBUMIN SERPL ELPH-MCNC: 3.5 G/DL — SIGNIFICANT CHANGE UP (ref 3.5–5.2)
ALP SERPL-CCNC: 51 U/L — SIGNIFICANT CHANGE UP (ref 30–115)
ALT FLD-CCNC: 7 U/L — SIGNIFICANT CHANGE UP (ref 0–41)
ANION GAP SERPL CALC-SCNC: 12 MMOL/L — SIGNIFICANT CHANGE UP (ref 7–14)
AST SERPL-CCNC: 23 U/L — SIGNIFICANT CHANGE UP (ref 0–41)
BILIRUB SERPL-MCNC: 1.1 MG/DL — SIGNIFICANT CHANGE UP (ref 0.2–1.2)
BUN SERPL-MCNC: 13 MG/DL — SIGNIFICANT CHANGE UP (ref 10–20)
CALCIUM SERPL-MCNC: 8.9 MG/DL — SIGNIFICANT CHANGE UP (ref 8.4–10.5)
CHLORIDE SERPL-SCNC: 99 MMOL/L — SIGNIFICANT CHANGE UP (ref 98–110)
CO2 SERPL-SCNC: 26 MMOL/L — SIGNIFICANT CHANGE UP (ref 17–32)
CREAT SERPL-MCNC: 0.8 MG/DL — SIGNIFICANT CHANGE UP (ref 0.7–1.5)
CRP SERPL-MCNC: 171.7 MG/L — HIGH
EGFR: 94 ML/MIN/1.73M2 — SIGNIFICANT CHANGE UP
GLUCOSE SERPL-MCNC: 245 MG/DL — HIGH (ref 70–99)
GRAM STN FLD: SIGNIFICANT CHANGE UP
HCT VFR BLD CALC: 39.7 % — LOW (ref 42–52)
HGB BLD-MCNC: 13.4 G/DL — LOW (ref 14–18)
MAGNESIUM SERPL-MCNC: 2 MG/DL — SIGNIFICANT CHANGE UP (ref 1.8–2.4)
MCHC RBC-ENTMCNC: 30 PG — SIGNIFICANT CHANGE UP (ref 27–31)
MCHC RBC-ENTMCNC: 33.8 G/DL — SIGNIFICANT CHANGE UP (ref 32–37)
MCV RBC AUTO: 88.8 FL — SIGNIFICANT CHANGE UP (ref 80–94)
NRBC # BLD: 0 /100 WBCS — SIGNIFICANT CHANGE UP (ref 0–0)
PLATELET # BLD AUTO: 199 K/UL — SIGNIFICANT CHANGE UP (ref 130–400)
POTASSIUM SERPL-MCNC: 3.7 MMOL/L — SIGNIFICANT CHANGE UP (ref 3.5–5)
POTASSIUM SERPL-SCNC: 3.7 MMOL/L — SIGNIFICANT CHANGE UP (ref 3.5–5)
PROT SERPL-MCNC: 6 G/DL — SIGNIFICANT CHANGE UP (ref 6–8)
RBC # BLD: 4.47 M/UL — LOW (ref 4.7–6.1)
RBC # FLD: 12 % — SIGNIFICANT CHANGE UP (ref 11.5–14.5)
SODIUM SERPL-SCNC: 137 MMOL/L — SIGNIFICANT CHANGE UP (ref 135–146)
SPECIMEN SOURCE: SIGNIFICANT CHANGE UP
VANCOMYCIN TROUGH SERPL-MCNC: 9 UG/ML — SIGNIFICANT CHANGE UP (ref 5–10)
WBC # BLD: 6.1 K/UL — SIGNIFICANT CHANGE UP (ref 4.8–10.8)
WBC # FLD AUTO: 6.1 K/UL — SIGNIFICANT CHANGE UP (ref 4.8–10.8)

## 2023-02-14 PROCEDURE — 99232 SBSQ HOSP IP/OBS MODERATE 35: CPT

## 2023-02-14 RX ORDER — CARBIDOPA AND LEVODOPA 25; 100 MG/1; MG/1
1 TABLET ORAL
Refills: 0 | Status: DISCONTINUED | OUTPATIENT
Start: 2023-02-14 | End: 2023-02-16

## 2023-02-14 RX ORDER — BACLOFEN 100 %
5 POWDER (GRAM) MISCELLANEOUS
Refills: 0 | Status: DISCONTINUED | OUTPATIENT
Start: 2023-02-14 | End: 2023-02-16

## 2023-02-14 RX ADMIN — CARBIDOPA AND LEVODOPA 1 TABLET(S): 25; 100 TABLET ORAL at 05:50

## 2023-02-14 RX ADMIN — CEFEPIME 100 MILLIGRAM(S): 1 INJECTION, POWDER, FOR SOLUTION INTRAMUSCULAR; INTRAVENOUS at 06:08

## 2023-02-14 RX ADMIN — Medication 300 MILLIGRAM(S): at 05:54

## 2023-02-14 RX ADMIN — CEFEPIME 100 MILLIGRAM(S): 1 INJECTION, POWDER, FOR SOLUTION INTRAMUSCULAR; INTRAVENOUS at 13:31

## 2023-02-14 RX ADMIN — ROPINIROLE 1 MILLIGRAM(S): 8 TABLET, FILM COATED, EXTENDED RELEASE ORAL at 13:31

## 2023-02-14 RX ADMIN — ROPINIROLE 1 MILLIGRAM(S): 8 TABLET, FILM COATED, EXTENDED RELEASE ORAL at 05:50

## 2023-02-14 RX ADMIN — ATORVASTATIN CALCIUM 20 MILLIGRAM(S): 80 TABLET, FILM COATED ORAL at 22:07

## 2023-02-14 RX ADMIN — ENOXAPARIN SODIUM 40 MILLIGRAM(S): 100 INJECTION SUBCUTANEOUS at 05:59

## 2023-02-14 RX ADMIN — Medication 5 MILLIGRAM(S): at 13:30

## 2023-02-14 RX ADMIN — CARBIDOPA AND LEVODOPA 1 TABLET(S): 25; 100 TABLET ORAL at 22:07

## 2023-02-14 RX ADMIN — Medication 5 MILLIGRAM(S): at 22:07

## 2023-02-14 RX ADMIN — DULOXETINE HYDROCHLORIDE 30 MILLIGRAM(S): 30 CAPSULE, DELAYED RELEASE ORAL at 13:31

## 2023-02-14 RX ADMIN — CARBIDOPA AND LEVODOPA 1 TABLET(S): 25; 100 TABLET ORAL at 17:31

## 2023-02-14 RX ADMIN — FINASTERIDE 5 MILLIGRAM(S): 5 TABLET, FILM COATED ORAL at 13:30

## 2023-02-14 RX ADMIN — Medication 5 MILLIGRAM(S): at 17:31

## 2023-02-14 RX ADMIN — Medication 5 MILLIGRAM(S): at 05:53

## 2023-02-14 RX ADMIN — TAMSULOSIN HYDROCHLORIDE 0.4 MILLIGRAM(S): 0.4 CAPSULE ORAL at 22:07

## 2023-02-14 RX ADMIN — PANTOPRAZOLE SODIUM 40 MILLIGRAM(S): 20 TABLET, DELAYED RELEASE ORAL at 05:55

## 2023-02-14 RX ADMIN — Medication 5 MILLIGRAM(S): at 05:52

## 2023-02-14 RX ADMIN — ATENOLOL 100 MILLIGRAM(S): 25 TABLET ORAL at 05:53

## 2023-02-14 RX ADMIN — CARBIDOPA AND LEVODOPA 1 TABLET(S): 25; 100 TABLET ORAL at 13:30

## 2023-02-14 RX ADMIN — Medication 300 MILLIGRAM(S): at 22:18

## 2023-02-14 RX ADMIN — ROPINIROLE 1 MILLIGRAM(S): 8 TABLET, FILM COATED, EXTENDED RELEASE ORAL at 22:33

## 2023-02-14 NOTE — PHARMACOTHERAPY INTERVENTION NOTE - NSPHARMCOMMASP
ASP - Dose optimization/Non-Renal dose adjustment
ASP - Lab/ test recommended

## 2023-02-14 NOTE — ED PROCEDURE NOTE - ATTENDING CONTRIBUTION TO CARE
I was not immediately present for this procedure but was available throughout and agree with its being done. It states above that I was present as that is a radio button that must be clicked in order me to save this note.

## 2023-02-14 NOTE — PHARMACOTHERAPY INTERVENTION NOTE - COMMENTS
As per [employer] calculations, current vancomycin AUC/VAMSI is predicted to be 297.79 mg/L*h, but will await pre-fourth trough to recalculate. Serum creatinine is 0.9 mg/dL. Can continue vancomycin 1500 mg IV q12h for now.    Liz Madrigal, PharmD, [facility]  Clinical Pharmacy Specialist, Infectious Diseases  Tele-Antimicrobial Stewardship Program (Tele-ASP)  Tele-ASP Phone: [phone]  
In order to perform vancomycin pharmacokinetic monitoring, updated patient's height for this admission to 175.3 cm, as height was previously documented as 175.3 cm per [facility], and there was no height documented for this admission.    Liz Madrigal, PharmD, [facility]  Clinical Pharmacy Specialist, Infectious Diseases  Tele-Antimicrobial Stewardship Program (Tele-ASP)  Tele-ASP Phone: [phone]  
The vancomycin level today, 2/14 was 9 mg/L. As per PrecisePK calculations, current vancomycin AUC/VAMSI is 444.99 mg/L*h. Serum creatinine is 0.8 mg/dL. Can continue vancomycin 1500 mg IV q12h.    Liz Madrigal, PharmD, [facility]  Clinical Pharmacy Specialist, Infectious Diseases  Tele-Antimicrobial Stewardship Program (Tele-ASP)  Tele-ASP Phone: [phone]  
As per policy, ordered a vancomycin trough for 2/14 at 4pm in order to assist with pharmacokinetic vancomycin monitoring.    Liz Madrigal, PharmD, [facility]  Clinical Pharmacy Specialist, Infectious Diseases  Tele-Antimicrobial Stewardship Program (Tele-ASP)  Tele-ASP Phone: [phone]

## 2023-02-14 NOTE — PROGRESS NOTE ADULT - ATTENDING COMMENTS
73-year-old male with history of hypertension, hyperlipidemia, CVA with left-sided hemiparesis, on aspirin, chronic right knee pain with monthly injections by Dr. Lehman of pain management, presents with worsening right knee pain.    S: patient states his knee pain has improved. But states his parkinsons giving more problem. States he is not getting medications as he takes at home.    A&P    # right knee pain and swelling to R/o septic arthritis  XR knee: No evidence of acute osseous abnormality.  - ESR 45,   - s/p arthrocentesis: cell count 38K  - gram stain -ve; cultures- pending; blood cultures: NGTD- monitor  - ID following- continue vanco and cefepime; monitor vanc trough prior to 4 th dose    # Parkinsons disease  - C/w sinemet and roprinole, baclofen  - per patient he takes sinemet 1 tab at 8,12pm,4pm and 8pm  - baclofen at 8am, 12pm and 4 pm  - change medicaiton to patient's home schedule  - C/w duloxetine  - Reviewe home medd reconciliation. Advised patient's cousin to bring list of medicines and timing of administration.     # Hx of CVA with residual left sided hemiparesis  - Patient not on ASA or plavix    # HTN- C/w hydrochlorothiazide and atenolol    # DL- C/w atorvastatin    DVT Prophylaxis: lovenox    Progress note hand off:  Pending: fluid and blood cultures; vanco trough level, PT eval, ortho follow up  Family discussion: plan of care d/w patient and also with family member-cousin  Dispo: pending

## 2023-02-15 LAB
ALBUMIN SERPL ELPH-MCNC: 3.5 G/DL — SIGNIFICANT CHANGE UP (ref 3.5–5.2)
ALP SERPL-CCNC: 55 U/L — SIGNIFICANT CHANGE UP (ref 30–115)
ALT FLD-CCNC: 22 U/L — SIGNIFICANT CHANGE UP (ref 0–41)
ANION GAP SERPL CALC-SCNC: 12 MMOL/L — SIGNIFICANT CHANGE UP (ref 7–14)
AST SERPL-CCNC: 28 U/L — SIGNIFICANT CHANGE UP (ref 0–41)
BILIRUB SERPL-MCNC: 1.1 MG/DL — SIGNIFICANT CHANGE UP (ref 0.2–1.2)
BUN SERPL-MCNC: 13 MG/DL — SIGNIFICANT CHANGE UP (ref 10–20)
CALCIUM SERPL-MCNC: 9.1 MG/DL — SIGNIFICANT CHANGE UP (ref 8.4–10.5)
CHLORIDE SERPL-SCNC: 99 MMOL/L — SIGNIFICANT CHANGE UP (ref 98–110)
CO2 SERPL-SCNC: 25 MMOL/L — SIGNIFICANT CHANGE UP (ref 17–32)
CREAT SERPL-MCNC: 0.8 MG/DL — SIGNIFICANT CHANGE UP (ref 0.7–1.5)
EGFR: 94 ML/MIN/1.73M2 — SIGNIFICANT CHANGE UP
GLUCOSE SERPL-MCNC: 183 MG/DL — HIGH (ref 70–99)
HCT VFR BLD CALC: 42.4 % — SIGNIFICANT CHANGE UP (ref 42–52)
HGB BLD-MCNC: 14.4 G/DL — SIGNIFICANT CHANGE UP (ref 14–18)
MAGNESIUM SERPL-MCNC: 2.1 MG/DL — SIGNIFICANT CHANGE UP (ref 1.8–2.4)
MCHC RBC-ENTMCNC: 29.9 PG — SIGNIFICANT CHANGE UP (ref 27–31)
MCHC RBC-ENTMCNC: 34 G/DL — SIGNIFICANT CHANGE UP (ref 32–37)
MCV RBC AUTO: 88 FL — SIGNIFICANT CHANGE UP (ref 80–94)
NRBC # BLD: 0 /100 WBCS — SIGNIFICANT CHANGE UP (ref 0–0)
PLATELET # BLD AUTO: 250 K/UL — SIGNIFICANT CHANGE UP (ref 130–400)
POTASSIUM SERPL-MCNC: 3.7 MMOL/L — SIGNIFICANT CHANGE UP (ref 3.5–5)
POTASSIUM SERPL-SCNC: 3.7 MMOL/L — SIGNIFICANT CHANGE UP (ref 3.5–5)
PROT SERPL-MCNC: 6.2 G/DL — SIGNIFICANT CHANGE UP (ref 6–8)
RBC # BLD: 4.82 M/UL — SIGNIFICANT CHANGE UP (ref 4.7–6.1)
RBC # FLD: 12.1 % — SIGNIFICANT CHANGE UP (ref 11.5–14.5)
SODIUM SERPL-SCNC: 136 MMOL/L — SIGNIFICANT CHANGE UP (ref 135–146)
SYNOVIAL CRYSTALS CLARITY: ABNORMAL
SYNOVIAL CRYSTALS COLOR: YELLOW
SYNOVIAL CRYSTALS ID: ABNORMAL
SYNOVIAL CRYSTALS TUBE: SIGNIFICANT CHANGE UP
WBC # BLD: 7.81 K/UL — SIGNIFICANT CHANGE UP (ref 4.8–10.8)
WBC # FLD AUTO: 7.81 K/UL — SIGNIFICANT CHANGE UP (ref 4.8–10.8)

## 2023-02-15 PROCEDURE — 99222 1ST HOSP IP/OBS MODERATE 55: CPT

## 2023-02-15 PROCEDURE — 99232 SBSQ HOSP IP/OBS MODERATE 35: CPT

## 2023-02-15 RX ORDER — MECLIZINE HCL 12.5 MG
25 TABLET ORAL THREE TIMES A DAY
Refills: 0 | Status: DISCONTINUED | OUTPATIENT
Start: 2023-02-15 | End: 2023-02-16

## 2023-02-15 RX ORDER — LOSARTAN POTASSIUM 100 MG/1
50 TABLET, FILM COATED ORAL DAILY
Refills: 0 | Status: DISCONTINUED | OUTPATIENT
Start: 2023-02-15 | End: 2023-02-16

## 2023-02-15 RX ADMIN — ROPINIROLE 1 MILLIGRAM(S): 8 TABLET, FILM COATED, EXTENDED RELEASE ORAL at 21:54

## 2023-02-15 RX ADMIN — Medication 5 MILLIGRAM(S): at 07:57

## 2023-02-15 RX ADMIN — Medication 5 MILLIGRAM(S): at 12:09

## 2023-02-15 RX ADMIN — PANTOPRAZOLE SODIUM 40 MILLIGRAM(S): 20 TABLET, DELAYED RELEASE ORAL at 06:08

## 2023-02-15 RX ADMIN — ENOXAPARIN SODIUM 40 MILLIGRAM(S): 100 INJECTION SUBCUTANEOUS at 06:08

## 2023-02-15 RX ADMIN — Medication 5 MILLIGRAM(S): at 21:54

## 2023-02-15 RX ADMIN — ATORVASTATIN CALCIUM 20 MILLIGRAM(S): 80 TABLET, FILM COATED ORAL at 21:53

## 2023-02-15 RX ADMIN — Medication 40 MILLIGRAM(S): at 08:38

## 2023-02-15 RX ADMIN — FINASTERIDE 5 MILLIGRAM(S): 5 TABLET, FILM COATED ORAL at 12:08

## 2023-02-15 RX ADMIN — CEFEPIME 100 MILLIGRAM(S): 1 INJECTION, POWDER, FOR SOLUTION INTRAMUSCULAR; INTRAVENOUS at 02:10

## 2023-02-15 RX ADMIN — DULOXETINE HYDROCHLORIDE 30 MILLIGRAM(S): 30 CAPSULE, DELAYED RELEASE ORAL at 12:08

## 2023-02-15 RX ADMIN — Medication 5 MILLIGRAM(S): at 13:29

## 2023-02-15 RX ADMIN — Medication 25 MILLIGRAM(S): at 21:54

## 2023-02-15 RX ADMIN — Medication 5 MILLIGRAM(S): at 17:41

## 2023-02-15 RX ADMIN — CARBIDOPA AND LEVODOPA 1 TABLET(S): 25; 100 TABLET ORAL at 07:57

## 2023-02-15 RX ADMIN — CARBIDOPA AND LEVODOPA 1 TABLET(S): 25; 100 TABLET ORAL at 12:09

## 2023-02-15 RX ADMIN — ROPINIROLE 1 MILLIGRAM(S): 8 TABLET, FILM COATED, EXTENDED RELEASE ORAL at 06:10

## 2023-02-15 RX ADMIN — CARBIDOPA AND LEVODOPA 1 TABLET(S): 25; 100 TABLET ORAL at 21:54

## 2023-02-15 RX ADMIN — TAMSULOSIN HYDROCHLORIDE 0.4 MILLIGRAM(S): 0.4 CAPSULE ORAL at 21:54

## 2023-02-15 RX ADMIN — Medication 5 MILLIGRAM(S): at 06:09

## 2023-02-15 RX ADMIN — CARBIDOPA AND LEVODOPA 1 TABLET(S): 25; 100 TABLET ORAL at 17:42

## 2023-02-15 RX ADMIN — ATENOLOL 100 MILLIGRAM(S): 25 TABLET ORAL at 06:09

## 2023-02-15 RX ADMIN — ROPINIROLE 1 MILLIGRAM(S): 8 TABLET, FILM COATED, EXTENDED RELEASE ORAL at 13:29

## 2023-02-15 NOTE — PHYSICAL THERAPY INITIAL EVALUATION ADULT - LIVES WITH, PROFILE
Colon-   Exam to cecum and ICV.  Int and ext hemorrhoids o/w normal    Suspect blood loss is from portal gastropathy      REC Begin Coreg  
friend

## 2023-02-15 NOTE — CONSULT NOTE ADULT - ASSESSMENT
Right knee swelling  -Patient had right knee xray showing degenerative changes, ESR 45, --> 171, arthrocentesis showing 38,431 cells, 90% PMN, intra and extracellular CPPD crystals, gram stain negative, uric acid 4.7. No fevers noted and wbc count normal. Overall clinical picture is suggestive of osteoarthritis and pseudogout right knee  -Continue prednisone 40 mg daily until symptoms resolve  -He can follow up with me within 1 week of discharge 
73-year-old male with history of hypertension, hyperlipidemia, CVA with left-sided weakness, on aspirin, chronic right knee pain with monthly injections by Dr. Lehman of pain management, presents with worsening right knee pain. He states the pain management doctor usually injects him on the medial aspect of his knee, however on Wednesday for the first time he injected him on the lateral aspect. He states the procedure itself was very painful and felt like he was hitting the bone. Since the procedure he has had significantly progressively worsening pain to the area. Reports subjective fever for the past 2 days, last took Tylenol this morning.    IMPRESSION;   Septic arthritis right knee  2/12 Knee fluid ; PMNs, no org.  No evidence of endocarditis    RECOMMENDATIONS;  BCx   Knee fluid cultures  Vancomycin 1500 mg iv q12h  Cefepime 2 gm iv q8h

## 2023-02-15 NOTE — CONSULT NOTE ADULT - SUBJECTIVE AND OBJECTIVE BOX
Rheumatology consult       Patient is a 73y old  Male who presents with a chief complaint of KNEE PAIN (13 Feb 2023 23:28)      HPI:  73-year-old male with history of hypertension, hyperlipidemia, CVA with left-sided weakness, on aspirin, chronic right knee pain with monthly injections by Dr. Lehman of pain management, presents with worsening right knee pain. He states the pain management doctor usually injects him on the medial aspect of his knee, however on Wednesday for the first time he injected him on the lateral aspect. He states the procedure itself was very painful and felt like he was hitting the bone. Since the procedure he has had significantly progressively worsening pain to the area. Reports subjective fever for the past 2 days, last took Tylenol this morning. Denies urinary changes, cough, chest pain, shortness of breath, vomiting, diarrhea, and all other symptoms. On exam, afebrile, hemodynamically stable, saturating well on room air, NAD, nontoxic appearing, appearance of chronic illness.    Knee joint was tapped in the ED. Cultures sent. S/p vanc and zosyn.  Ortho onboard as per ED    Admit to medicine for possible septic arthritis  Please verify meds in AM     (12 Feb 2023 15:34)       ROS:  Negative except for:    PAST MEDICAL & SURGICAL HISTORY:  HTN (hypertension)      Dyslipidemia          SOCIAL HISTORY:    FAMILY HISTORY:  No pertinent family history (Father, Mother)        MEDICATIONS  (STANDING):  atenolol  Tablet 100 milliGRAM(s) Oral daily  atorvastatin 20 milliGRAM(s) Oral at bedtime  baclofen 5 milliGRAM(s) Oral <User Schedule>  carbidopa/levodopa  25/250 1 Tablet(s) Oral <User Schedule>  DULoxetine 30 milliGRAM(s) Oral daily  enoxaparin Injectable 40 milliGRAM(s) SubCutaneous every 24 hours  finasteride 5 milliGRAM(s) Oral daily  losartan 50 milliGRAM(s) Oral daily  meclizine 25 milliGRAM(s) Oral three times a day  oxybutynin 5 milliGRAM(s) Oral three times a day  pantoprazole    Tablet 40 milliGRAM(s) Oral before breakfast  predniSONE   Tablet 40 milliGRAM(s) Oral daily  rOPINIRole 1 milliGRAM(s) Oral three times a day  tamsulosin 0.4 milliGRAM(s) Oral at bedtime    MEDICATIONS  (PRN):  oxycodone    5 mG/acetaminophen 325 mG 1 Tablet(s) Oral every 4 hours PRN Moderate Pain (4 - 6)      Allergies    [This allergen will not trigger allergy alert] Originally Entered as [Unknown] reaction to [nuts, shrimp, strawberry] (Unknown)  No Known Drug Allergies    Intolerances        Vital Signs Last 24 Hrs  T(C): 37.3 (15 Feb 2023 13:44), Max: 37.3 (15 Feb 2023 13:44)  T(F): 99.2 (15 Feb 2023 13:44), Max: 99.2 (15 Feb 2023 13:44)  HR: 88 (15 Feb 2023 13:44) (88 - 99)  BP: 140/79 (15 Feb 2023 13:44) (136/66 - 153/94)  BP(mean): --  RR: 18 (15 Feb 2023 13:44) (18 - 19)  SpO2: 96% (15 Feb 2023 04:48) (96% - 96%)    Parameters below as of 15 Feb 2023 04:48  Patient On (Oxygen Delivery Method): room air        PHYSICAL EXAM  General: adult in NAD  HEENT: clear oropharynx, anicteric sclera, pink conjunctiva  Neck: supple  CV: normal S1/S2 with no murmur rubs or gallops  Lungs: positive air movement b/l ant lungs,clear to auscultation, no wheezes, no rales  Abdomen: soft non-tender non-distended, no hepatosplenomegaly  Msk: Right knee mild effusion with crepitus  Ext: no clubbing cyanosis or edema  Skin: no rashes and no petechiae  Neuro: alert and oriented X 4, no focal deficits      LABS:                          14.4   7.81  )-----------( 250      ( 15 Feb 2023 08:03 )             42.4         Mean Cell Volume : 88.0 fL  Mean Cell Hemoglobin : 29.9 pg  Mean Cell Hemoglobin Concentration : 34.0 g/dL  Auto Neutrophil # : x  Auto Lymphocyte # : x  Auto Monocyte # : x  Auto Eosinophil # : x  Auto Basophil # : x  Auto Neutrophil % : x  Auto Lymphocyte % : x  Auto Monocyte % : x  Auto Eosinophil % : x  Auto Basophil % : x      02-15    136  |  99  |  13  ----------------------------<  183<H>  3.7   |  25  |  0.8    Ca    9.1      15 Feb 2023 08:03  Mg     2.1     02-15    TPro  6.2  /  Alb  3.5  /  TBili  1.1  /  DBili  x   /  AST  28  /  ALT  22  /  AlkPhos  55  02-15                      BLOOD SMEAR INTERPRETATION:       RADIOLOGY & ADDITIONAL STUDIES:

## 2023-02-15 NOTE — PROGRESS NOTE ADULT - ASSESSMENT
73-year-old male with history of hypertension, hyperlipidemia, CVA with left-sided hemiparesis, on aspirin, chronic right knee pain with monthly injections by Dr. Lehman of pain management, presents with worsening right knee pain.         # Right knee pain and swelling 2/2 Pseudogout flare   - septic arthritis ruled out   - dc abx   - start prednisone   - dc HCTZ      # Parkinson's disease  - C/w sinemet and ropinirole, baclofen  - C/w duloxetine    # Hx of CVA with residual left sided hemiparesis  - Patient not on ASA or Plavix (unclear if ischemic vs hemorrhagic)   - c/w statin     # HTN- C/w atenolol, add Losartan 50mg     # DL- C/w atorvastatin    DVT Prophylaxis: Lovenox    Progress note hand off:  Pending: STR placement   Family discussion: plan of care d/w patient and also with family friend   Dispo: [facility]

## 2023-02-15 NOTE — PROGRESS NOTE ADULT - ASSESSMENT
· Assessment	  73-year-old male with history of hypertension, hyperlipidemia, CVA with left-sided weakness, on aspirin, chronic right knee pain with monthly injections by Dr. Lehman of pain management, presents with worsening right knee pain. He states the pain management doctor usually injects him on the medial aspect of his knee, however on Wednesday for the first time he injected him on the lateral aspect. He states the procedure itself was very painful and felt like he was hitting the bone. Since the procedure he has had significantly progressively worsening pain to the area. Reports subjective fever for the past 2 days, last took Tylenol this morning.    IMPRESSION;   Acute synovitis ( Gout )  right knee  Calcium pyrophosphate crystals positive  2/12 Knee fluid ; PMNs, no org.  2/12 BCX NG    RECOMMENDATIONS;  Rheum evaluation  Consider prednisone  Knee fluid cultures. If NG hold ABx  Vancomycin 1500 mg iv q12h  Cefepime 2 gm iv q8h

## 2023-02-16 ENCOUNTER — TRANSCRIPTION ENCOUNTER (OUTPATIENT)
Age: 73
End: 2023-02-16

## 2023-02-16 VITALS
RESPIRATION RATE: 18 BRPM | TEMPERATURE: 99 F | SYSTOLIC BLOOD PRESSURE: 137 MMHG | DIASTOLIC BLOOD PRESSURE: 83 MMHG | HEART RATE: 84 BPM

## 2023-02-16 LAB
ALBUMIN SERPL ELPH-MCNC: 3.8 G/DL — SIGNIFICANT CHANGE UP (ref 3.5–5.2)
ALP SERPL-CCNC: 56 U/L — SIGNIFICANT CHANGE UP (ref 30–115)
ALT FLD-CCNC: 34 U/L — SIGNIFICANT CHANGE UP (ref 0–41)
ANION GAP SERPL CALC-SCNC: 14 MMOL/L — SIGNIFICANT CHANGE UP (ref 7–14)
AST SERPL-CCNC: 28 U/L — SIGNIFICANT CHANGE UP (ref 0–41)
BILIRUB SERPL-MCNC: 1.1 MG/DL — SIGNIFICANT CHANGE UP (ref 0.2–1.2)
BUN SERPL-MCNC: 22 MG/DL — HIGH (ref 10–20)
CALCIUM SERPL-MCNC: 9.3 MG/DL — SIGNIFICANT CHANGE UP (ref 8.4–10.5)
CHLORIDE SERPL-SCNC: 99 MMOL/L — SIGNIFICANT CHANGE UP (ref 98–110)
CO2 SERPL-SCNC: 27 MMOL/L — SIGNIFICANT CHANGE UP (ref 17–32)
CREAT SERPL-MCNC: 1.1 MG/DL — SIGNIFICANT CHANGE UP (ref 0.7–1.5)
EGFR: 71 ML/MIN/1.73M2 — SIGNIFICANT CHANGE UP
GLUCOSE SERPL-MCNC: 180 MG/DL — HIGH (ref 70–99)
HCT VFR BLD CALC: 43.6 % — SIGNIFICANT CHANGE UP (ref 42–52)
HGB BLD-MCNC: 14.7 G/DL — SIGNIFICANT CHANGE UP (ref 14–18)
MAGNESIUM SERPL-MCNC: 2.2 MG/DL — SIGNIFICANT CHANGE UP (ref 1.8–2.4)
MCHC RBC-ENTMCNC: 29.7 PG — SIGNIFICANT CHANGE UP (ref 27–31)
MCHC RBC-ENTMCNC: 33.7 G/DL — SIGNIFICANT CHANGE UP (ref 32–37)
MCV RBC AUTO: 88.1 FL — SIGNIFICANT CHANGE UP (ref 80–94)
NRBC # BLD: 0 /100 WBCS — SIGNIFICANT CHANGE UP (ref 0–0)
PLATELET # BLD AUTO: 287 K/UL — SIGNIFICANT CHANGE UP (ref 130–400)
POTASSIUM SERPL-MCNC: 3.7 MMOL/L — SIGNIFICANT CHANGE UP (ref 3.5–5)
POTASSIUM SERPL-SCNC: 3.7 MMOL/L — SIGNIFICANT CHANGE UP (ref 3.5–5)
PROT SERPL-MCNC: 6.5 G/DL — SIGNIFICANT CHANGE UP (ref 6–8)
RBC # BLD: 4.95 M/UL — SIGNIFICANT CHANGE UP (ref 4.7–6.1)
RBC # FLD: 12 % — SIGNIFICANT CHANGE UP (ref 11.5–14.5)
SODIUM SERPL-SCNC: 140 MMOL/L — SIGNIFICANT CHANGE UP (ref 135–146)
WBC # BLD: 7.6 K/UL — SIGNIFICANT CHANGE UP (ref 4.8–10.8)
WBC # FLD AUTO: 7.6 K/UL — SIGNIFICANT CHANGE UP (ref 4.8–10.8)

## 2023-02-16 PROCEDURE — 99232 SBSQ HOSP IP/OBS MODERATE 35: CPT

## 2023-02-16 RX ORDER — CARBIDOPA AND LEVODOPA 25; 100 MG/1; MG/1
1 TABLET ORAL
Qty: 0 | Refills: 0 | DISCHARGE

## 2023-02-16 RX ORDER — ATORVASTATIN CALCIUM 80 MG/1
1 TABLET, FILM COATED ORAL
Qty: 0 | Refills: 0 | DISCHARGE
Start: 2023-02-16

## 2023-02-16 RX ORDER — HYDROCHLOROTHIAZIDE 25 MG
1 TABLET ORAL
Qty: 0 | Refills: 0 | DISCHARGE

## 2023-02-16 RX ORDER — ROPINIROLE 8 MG/1
1 TABLET, FILM COATED, EXTENDED RELEASE ORAL
Qty: 0 | Refills: 0 | DISCHARGE
Start: 2023-02-16

## 2023-02-16 RX ORDER — LOSARTAN POTASSIUM 100 MG/1
1 TABLET, FILM COATED ORAL
Qty: 0 | Refills: 0 | DISCHARGE
Start: 2023-02-16

## 2023-02-16 RX ORDER — ATENOLOL 25 MG/1
1 TABLET ORAL
Qty: 0 | Refills: 0 | DISCHARGE
Start: 2023-02-16

## 2023-02-16 RX ORDER — BACLOFEN 100 %
1 POWDER (GRAM) MISCELLANEOUS
Qty: 0 | Refills: 0 | DISCHARGE
Start: 2023-02-16

## 2023-02-16 RX ORDER — OXYCODONE AND ACETAMINOPHEN 5; 325 MG/1; MG/1
1 TABLET ORAL
Qty: 0 | Refills: 0 | DISCHARGE
Start: 2023-02-16

## 2023-02-16 RX ORDER — CARBIDOPA AND LEVODOPA 25; 100 MG/1; MG/1
1 TABLET ORAL
Qty: 0 | Refills: 0 | DISCHARGE
Start: 2023-02-16

## 2023-02-16 RX ORDER — OXYBUTYNIN CHLORIDE 5 MG
1 TABLET ORAL
Qty: 0 | Refills: 0 | DISCHARGE
Start: 2023-02-16

## 2023-02-16 RX ORDER — ROPINIROLE 8 MG/1
1 TABLET, FILM COATED, EXTENDED RELEASE ORAL
Qty: 0 | Refills: 0 | DISCHARGE

## 2023-02-16 RX ORDER — TAMSULOSIN HYDROCHLORIDE 0.4 MG/1
1 CAPSULE ORAL
Qty: 0 | Refills: 0 | DISCHARGE
Start: 2023-02-16

## 2023-02-16 RX ORDER — FINASTERIDE 5 MG/1
1 TABLET, FILM COATED ORAL
Qty: 0 | Refills: 0 | DISCHARGE
Start: 2023-02-16

## 2023-02-16 RX ORDER — ATENOLOL 25 MG/1
1 TABLET ORAL
Qty: 0 | Refills: 0 | DISCHARGE

## 2023-02-16 RX ORDER — FINASTERIDE 5 MG/1
1 TABLET, FILM COATED ORAL
Qty: 0 | Refills: 0 | DISCHARGE

## 2023-02-16 RX ORDER — ATORVASTATIN CALCIUM 80 MG/1
1 TABLET, FILM COATED ORAL
Qty: 0 | Refills: 0 | DISCHARGE

## 2023-02-16 RX ORDER — OXYBUTYNIN CHLORIDE 5 MG
1 TABLET ORAL
Qty: 0 | Refills: 0 | DISCHARGE

## 2023-02-16 RX ORDER — DULOXETINE HYDROCHLORIDE 30 MG/1
1 CAPSULE, DELAYED RELEASE ORAL
Qty: 0 | Refills: 0 | DISCHARGE

## 2023-02-16 RX ORDER — BACLOFEN 100 %
1 POWDER (GRAM) MISCELLANEOUS
Qty: 0 | Refills: 0 | DISCHARGE

## 2023-02-16 RX ORDER — TAMSULOSIN HYDROCHLORIDE 0.4 MG/1
1 CAPSULE ORAL
Qty: 0 | Refills: 0 | DISCHARGE

## 2023-02-16 RX ADMIN — Medication 5 MILLIGRAM(S): at 06:04

## 2023-02-16 RX ADMIN — Medication 5 MILLIGRAM(S): at 09:51

## 2023-02-16 RX ADMIN — Medication 40 MILLIGRAM(S): at 06:04

## 2023-02-16 RX ADMIN — ROPINIROLE 1 MILLIGRAM(S): 8 TABLET, FILM COATED, EXTENDED RELEASE ORAL at 14:18

## 2023-02-16 RX ADMIN — CARBIDOPA AND LEVODOPA 1 TABLET(S): 25; 100 TABLET ORAL at 09:51

## 2023-02-16 RX ADMIN — ROPINIROLE 1 MILLIGRAM(S): 8 TABLET, FILM COATED, EXTENDED RELEASE ORAL at 06:03

## 2023-02-16 RX ADMIN — PANTOPRAZOLE SODIUM 40 MILLIGRAM(S): 20 TABLET, DELAYED RELEASE ORAL at 06:03

## 2023-02-16 RX ADMIN — Medication 5 MILLIGRAM(S): at 17:38

## 2023-02-16 RX ADMIN — LOSARTAN POTASSIUM 50 MILLIGRAM(S): 100 TABLET, FILM COATED ORAL at 06:03

## 2023-02-16 RX ADMIN — CARBIDOPA AND LEVODOPA 1 TABLET(S): 25; 100 TABLET ORAL at 17:39

## 2023-02-16 RX ADMIN — ATENOLOL 100 MILLIGRAM(S): 25 TABLET ORAL at 06:12

## 2023-02-16 RX ADMIN — Medication 25 MILLIGRAM(S): at 14:18

## 2023-02-16 RX ADMIN — CARBIDOPA AND LEVODOPA 1 TABLET(S): 25; 100 TABLET ORAL at 12:28

## 2023-02-16 RX ADMIN — Medication 5 MILLIGRAM(S): at 14:20

## 2023-02-16 RX ADMIN — FINASTERIDE 5 MILLIGRAM(S): 5 TABLET, FILM COATED ORAL at 12:27

## 2023-02-16 RX ADMIN — ENOXAPARIN SODIUM 40 MILLIGRAM(S): 100 INJECTION SUBCUTANEOUS at 06:03

## 2023-02-16 RX ADMIN — Medication 25 MILLIGRAM(S): at 06:03

## 2023-02-16 RX ADMIN — Medication 5 MILLIGRAM(S): at 12:27

## 2023-02-16 RX ADMIN — DULOXETINE HYDROCHLORIDE 30 MILLIGRAM(S): 30 CAPSULE, DELAYED RELEASE ORAL at 12:27

## 2023-02-16 NOTE — PROGRESS NOTE ADULT - TIME BILLING
direct pt's care, communication with medical team, health care proxy, chart review
Counseled patient about diagnostic testing and treatment plan. All questions answered. Abnormal lab/radiographical/microbiological data reviewed.

## 2023-02-16 NOTE — PROGRESS NOTE ADULT - ASSESSMENT
73-year-old male with history of hypertension, hyperlipidemia, CVA with left-sided hemiparesis, on aspirin, chronic right knee pain with monthly injections by Dr. Lehman of pain management, presents with worsening right knee pain.     A/P  #  Pseudogout flare   - no clinical evidence of  septic arthritis   - off  abx now   - consulted by rheumatology, recommendations noted   - started on po Prednisone with symptomatic relive     # Parkinson's disease  - C/w sinemet and ropinirole, baclofen  - C/w duloxetine    # Hx of CVA with residual left sided hemiparesis  - Patient not on ASA or Plavix (unclear if ischemic vs hemorrhagic)   - c/w statin     # HTN- C/w atenolol, add Losartan 50mg     # DL- C/w atorvastatin    DVT Prophylaxis: Lovenox    Progress note hand off:  Pending: STR placement   Family discussion: plan of care d/w patient and also with family friend   Dispo: [facility]    73-year-old male with history of hypertension, hyperlipidemia, CVA with left-sided hemiparesis, on aspirin, chronic right knee pain with monthly injections by Dr. Lehman of pain management, presents with worsening right knee pain.     A/P  #  Pseudogout flare   - no clinical evidence of  septic arthritis   - off  abx now   - consulted by rheumatology, recommendations noted   - started on po Prednisone with symptomatic relive   - PT/ OT as tolerated     # Parkinson's disease  - C/w sinemet and ropinirole, baclofen  - C/w duloxetine    # Hx of CVA with residual left sided hemiparesis  - Patient not on ASA or Plavix (unclear if ischemic vs hemorrhagic)   - c/w statin     # HTN  - DASH diet   - C/w atenolol, Losartan 50mg     # DL  - C/w atorvastatin    # BPH  - c/w Finasteride and Flomax   - maintain sufficient hydration   - monitor BUN/cr and urine output     DVT Prophylaxis: Lovenox    Progress note hand off:  Pending: STR placement   Family discussion: case was discussed with health care proxy and pt, they agreed with a plan of care and discharge planning

## 2023-02-16 NOTE — DISCHARGE NOTE PROVIDER - CARE PROVIDERS DIRECT ADDRESSES
[email or web address]

## 2023-02-16 NOTE — PROGRESS NOTE ADULT - SUBJECTIVE AND OBJECTIVE BOX
Pt seen and examined at bedside.           VITAL SIGNS (Last 24 hrs):  T(C): 37.3 (02-15-23 @ 13:44), Max: 37.3 (02-15-23 @ 13:44)  HR: 88 (02-15-23 @ 13:44) (88 - 99)  BP: 140/79 (02-15-23 @ 13:44) (136/66 - 153/94)   RR: 18 (02-15-23 @ 13:44) (18 - 19)  SpO2: 96% (02-15-23 @ 04:48) (96% - 96%)         I&O's Summary      PHYSICAL EXAM:  GENERAL: NAD   HEAD:  Atraumatic, Normocephalic  EYES:  conjunctiva and sclera clear  NECK: Supple, No JVD  CHEST/LUNG: Clear to auscultation bilaterally; No wheeze  HEART: Regular rate and rhythm; No murmurs, rubs, or gallops  ABDOMEN: Soft, Nontender, Nondistended; Bowel sounds present  EXTREMITIES:  2+ Peripheral Pulses, No clubbing, cyanosis, or edema  PSYCH: AAOx3  NEUROLOGY: non-focal  SKIN: No rashes or lesions    Labs Reviewed        CBC Full  -  ( 15 Feb 2023 08:03 )  WBC Count : 7.81 K/uL  Hemoglobin : 14.4 g/dL  Hematocrit : 42.4 %  Platelet Count - Automated : 250 K/uL  Mean Cell Volume : 88.0 fL  Mean Cell Hemoglobin : 29.9 pg  Mean Cell Hemoglobin Concentration : 34.0 g/dL  Auto Neutrophil # : x  Auto Lymphocyte # : x  Auto Monocyte # : x  Auto Eosinophil # : x  Auto Basophil # : x  Auto Neutrophil % : x  Auto Lymphocyte % : x  Auto Monocyte % : x  Auto Eosinophil % : x  Auto Basophil % : x    BMP:    02-15 @ 08:03    Blood Urea Nitrogen - 13  Calcium - 9.1  Carbond Dioxide - 25  Chloride - 99  Creatinine - 0.8  Glucose - 183  Potassium - 3.7  Sodium - 136      Hemoglobin A1c -   PT/INR - ( 12 Feb 2023 11:16 )   PT: 12.30 sec;   INR: 1.08 ratio         PTT - ( 13 Feb 2023 07:56 )  PTT:28.4 sec  Urine Culture:  02-12 @ 13:30 Urine culture: --    Culture Results:   No growth to date.  Method Type: --  Organism: --  Organism Identification: --  Specimen Source: Joint Fl Knee  02-12 @ 12:19 Urine culture: --    Culture Results:   <10,000 CFU/mL Normal Urogenital Tomasa  Method Type: --  Organism: --  Organism Identification: --  Specimen Source: Clean Catch Clean Catch (Midstream)  02-12 @ 11:16 Urine culture: --    Culture Results:   No growth to date.  Method Type: --  Organism: --  Organism Identification: --  Specimen Source: .Blood Blood-Peripheral        MEDICATIONS  (STANDING):  atenolol  Tablet 100 milliGRAM(s) Oral daily  atorvastatin 20 milliGRAM(s) Oral at bedtime  baclofen 5 milliGRAM(s) Oral <User Schedule>  carbidopa/levodopa  25/250 1 Tablet(s) Oral <User Schedule>  DULoxetine 30 milliGRAM(s) Oral daily  enoxaparin Injectable 40 milliGRAM(s) SubCutaneous every 24 hours  finasteride 5 milliGRAM(s) Oral daily  losartan 50 milliGRAM(s) Oral daily  meclizine 25 milliGRAM(s) Oral three times a day  oxybutynin 5 milliGRAM(s) Oral three times a day  pantoprazole    Tablet 40 milliGRAM(s) Oral before breakfast  predniSONE   Tablet 40 milliGRAM(s) Oral daily  rOPINIRole 1 milliGRAM(s) Oral three times a day  tamsulosin 0.4 milliGRAM(s) Oral at bedtime    MEDICATIONS  (PRN):  oxycodone    5 mG/acetaminophen 325 mG 1 Tablet(s) Oral every 4 hours PRN Moderate Pain (4 - 6)  
SUBJECTIVE:    Patient is a 73y old Male who presents with a chief complaint of KNEE PAIN (13 Feb 2023 23:28)    Currently admitted to medicine with the primary diagnosis of Septic joint of right knee joint       Today is hospital day 2d. This morning he is resting comfortably in bed and reports no new issues or overnight events.     PAST MEDICAL & SURGICAL HISTORY  HTN (hypertension)    Dyslipidemia      SOCIAL HISTORY:  Negative for smoking/alcohol/drug use.     ALLERGIES:  [This allergen will not trigger allergy alert] Originally Entered as [Unknown] reaction to [nuts, shrimp, strawberry] (Unknown)  No Known Drug Allergies    MEDICATIONS:  STANDING MEDICATIONS  atenolol  Tablet 100 milliGRAM(s) Oral daily  atorvastatin 20 milliGRAM(s) Oral at bedtime  baclofen 5 milliGRAM(s) Oral every 8 hours  carbidopa/levodopa  25/250 1 Tablet(s) Oral three times a day  cefepime   IVPB 2000 milliGRAM(s) IV Intermittent every 8 hours  DULoxetine 30 milliGRAM(s) Oral daily  enoxaparin Injectable 40 milliGRAM(s) SubCutaneous every 24 hours  finasteride 5 milliGRAM(s) Oral daily  hydrochlorothiazide 12.5 milliGRAM(s) Oral daily  oxybutynin 5 milliGRAM(s) Oral three times a day  pantoprazole    Tablet 40 milliGRAM(s) Oral before breakfast  rOPINIRole 1 milliGRAM(s) Oral three times a day  tamsulosin 0.4 milliGRAM(s) Oral at bedtime  vancomycin  IVPB 1500 milliGRAM(s) IV Intermittent every 12 hours    PRN MEDICATIONS  oxycodone    5 mG/acetaminophen 325 mG 1 Tablet(s) Oral every 4 hours PRN    VITALS:   T(F): 97.6  HR: 80  BP: 144/84  RR: 18  SpO2: 95%    LABS:                        13.4   6.10  )-----------( 199      ( 14 Feb 2023 07:42 )             39.7     02-14    137  |  99  |  13  ----------------------------<  245<H>  3.7   |  26  |  0.8    Ca    8.9      14 Feb 2023 07:42  Phos  2.5     02-13  Mg     2.0     02-14    TPro  6.0  /  Alb  3.5  /  TBili  1.1  /  DBili  x   /  AST  23  /  ALT  7   /  AlkPhos  51  02-14    PTT - ( 13 Feb 2023 07:56 )  PTT:28.4 sec          Culture - Urine (collected 12 Feb 2023 12:19)  Source: Clean Catch Clean Catch (Midstream)  Final Report (13 Feb 2023 21:46):    <10,000 CFU/mL Normal Urogenital Tomasa    Culture - Blood (collected 12 Feb 2023 11:16)  Source: .Blood Blood-Peripheral  Preliminary Report (13 Feb 2023 17:02):    No growth to date.    Culture - Blood (collected 12 Feb 2023 11:16)  Source: .Blood Blood-Peripheral  Preliminary Report (13 Feb 2023 17:02):    No growth to date.          RADIOLOGY:    PHYSICAL EXAM:  GEN: No acute distress  LUNGS: Clear to auscultation bilaterally   HEART: Regular  ABD: Soft, non-tender, non-distended.  EXT: right knee swelling, tenderness, decreased ROM, no erythema    Intravenous access:   NG tube:   Rollins Catheter:       
SUBJECTIVE:    Patient is a 73y old Male who presents with a chief complaint of right knee pain.  Currently admitted to medicine with the primary diagnosis of Septic joint of right knee joint       Today is hospital day 1d. This morning he is resting comfortably in bed and reports no new issues or overnight events.     PAST MEDICAL & SURGICAL HISTORY  HTN (hypertension)    Dyslipidemia      SOCIAL HISTORY:  Negative for smoking/alcohol/drug use.     ALLERGIES:  [This allergen will not trigger allergy alert] Originally Entered as [Unknown] reaction to [nuts, shrimp, strawberry] (Unknown)  No Known Drug Allergies    MEDICATIONS:  STANDING MEDICATIONS  atenolol  Tablet 100 milliGRAM(s) Oral daily  atorvastatin 20 milliGRAM(s) Oral at bedtime  baclofen 5 milliGRAM(s) Oral every 8 hours  carbidopa/levodopa  25/250 1 Tablet(s) Oral three times a day  DULoxetine 30 milliGRAM(s) Oral daily  enoxaparin Injectable 40 milliGRAM(s) SubCutaneous every 24 hours  finasteride 5 milliGRAM(s) Oral daily  hydrochlorothiazide 12.5 milliGRAM(s) Oral daily  oxybutynin 5 milliGRAM(s) Oral three times a day  pantoprazole    Tablet 40 milliGRAM(s) Oral before breakfast  piperacillin/tazobactam IVPB.. 3.375 Gram(s) IV Intermittent every 8 hours  rOPINIRole 1 milliGRAM(s) Oral three times a day  tamsulosin 0.4 milliGRAM(s) Oral at bedtime    PRN MEDICATIONS  oxycodone    5 mG/acetaminophen 325 mG 1 Tablet(s) Oral every 4 hours PRN    VITALS:   T(F): 98.6  HR: 80  BP: 124/67  RR: 17  SpO2: 97%    LABS:                        13.2   7.18  )-----------( 170      ( 2023 07:56 )             38.2     02-13    139  |  100  |  20  ----------------------------<  163<H>  4.3   |  30  |  0.9    Ca    9.0      2023 07:56  Phos  2.5     02-13  Mg     1.8     02-13    TPro  5.8<L>  /  Alb  3.5  /  TBili  1.3<H>  /  DBili  x   /  AST  17  /  ALT  6   /  AlkPhos  50  -    PT/INR - ( 2023 11:16 )   PT: 12.30 sec;   INR: 1.08 ratio         PTT - ( 2023 07:56 )  PTT:28.4 sec  Urinalysis Basic - ( 2023 12:19 )    Color: Yellow / Appearance: Clear / S.028 / pH: x  Gluc: x / Ketone: Trace  / Bili: Negative / Urobili: 3 mg/dL   Blood: x / Protein: 30 mg/dL / Nitrite: Negative   Leuk Esterase: Negative / RBC: 18 /HPF / WBC 6 /HPF   Sq Epi: x / Non Sq Epi: 2 /HPF / Bacteria: Negative        Lactate, Blood: 1.8 mmol/L (23 @ 11:16)  Sedimentation Rate, Erythrocyte: 45 mm/Hr *H* (23 @ 11:16)      PHYSICAL EXAM:  GEN: No acute distress  LUNGS: Clear to auscultation bilaterally   HEART: Regular S1S2  ABD: Soft, non-tender, non-distended.  EXT: Right knee is swollen and tender, no erythema, decreased ROM 2/2 pain  NEURO: AAOX3    Intravenous access:   NG tube:   Rollins Catheter:       
73-year-old male with history of hypertension, hyperlipidemia, CVA with left-sided hemiparesis, on aspirin, chronic right knee pain with monthly injections by Dr. Lehman of pain management, presents with worsening right knee pain. He was diagnosed with pseudogout, consulted by rheumatology, started on po Prednisone, Abx d/c.  Today pt feels better, has less pain in his right knee with better ROM, gets confused at night.        Vital Signs Last 24 Hrs  T(C): 37 (16 Feb 2023 12:51), Max: 37 (16 Feb 2023 12:51)  T(F): 98.6 (16 Feb 2023 12:51), Max: 98.6 (16 Feb 2023 12:51)  HR: 84 (16 Feb 2023 12:51) (83 - 104)  BP: 137/83 (16 Feb 2023 12:51) (124/74 - 137/83)  BP(mean): --  RR: 18 (16 Feb 2023 12:51) (18 - 18)      PHYSICAL EXAM:  GENERAL: NAD   HEAD:  Atraumatic, Normocephalic  EYES:  conjunctiva and sclera clear  NECK: Supple, No JVD  CHEST/LUNG: Clear to auscultation bilaterally; No wheeze  HEART: Regular rate and rhythm; No murmurs, rubs, or gallops  ABDOMEN: Soft, Nontender, Nondistended; Bowel sounds present  EXTREMITIES:  2+ Peripheral Pulses, No clubbing, cyanosis, or edema  PSYCH: AAOx3  NEUROLOGY: non-focal  SKIN: No rashes or lesions    Labs Reviewed:                        14.7   7.60  )-----------( 287      ( 16 Feb 2023 04:30 )             43.6   02-16    140  |  99  |  22<H>  ----------------------------<  180<H>  3.7   |  27  |  1.1    Ca    9.3      16 Feb 2023 04:30  Mg     2.2     02-16    TPro  6.5  /  Alb  3.8  /  TBili  1.1  /  DBili  x   /  AST  28  /  ALT  34  /  AlkPhos  56  02-16        PT/INR - ( 12 Feb 2023 11:16 )   PT: 12.30 sec;   INR: 1.08 ratio         PTT - ( 13 Feb 2023 07:56 )  PTT:28.4 sec  Urine Culture:  02-12 @ 13:30 Urine culture: --    Culture Results:   No growth to date.    Method Type: --  Organism: --  Organism Identification: --  Specimen Source: .Blood Blood-Peripheral    RADIOLOGY:   < from: Xray Knee AP + Lateral + Oblique 3 Views, Right (02.12.23 @ 10:29) >    Impression:  No evidence of acute osseous abnormality.    < end of copied text >  < from: Xray Femur 2 Views, Right (02.12.23 @ 10:27) >  Impression:  No evidence of acute osseous abnormality.    < end of copied text >  < from: TTE Echo Complete w/ Contrast w/ Doppler (02.13.23 @ 12:23) >  Summary:   1. Left ventricular ejection fraction, by visual estimation, is 55 to   60%.   2. Normal global left ventricular systolic function.   3. The left ventricular diastolic function could not be assessed in this   study.   4. Mild mitral regurgitation.    MEDICATIONS  (STANDING):  atenolol  Tablet 100 milliGRAM(s) Oral daily  atorvastatin 20 milliGRAM(s) Oral at bedtime  baclofen 5 milliGRAM(s) Oral <User Schedule>  carbidopa/levodopa  25/250 1 Tablet(s) Oral <User Schedule>  DULoxetine 30 milliGRAM(s) Oral daily  enoxaparin Injectable 40 milliGRAM(s) SubCutaneous every 24 hours  finasteride 5 milliGRAM(s) Oral daily  losartan 50 milliGRAM(s) Oral daily  meclizine 25 milliGRAM(s) Oral three times a day  oxybutynin 5 milliGRAM(s) Oral three times a day  pantoprazole    Tablet 40 milliGRAM(s) Oral before breakfast  predniSONE   Tablet 40 milliGRAM(s) Oral daily  rOPINIRole 1 milliGRAM(s) Oral three times a day  tamsulosin 0.4 milliGRAM(s) Oral at bedtime    MEDICATIONS  (PRN):  oxycodone    5 mG/acetaminophen 325 mG 1 Tablet(s) Oral every 4 hours PRN Moderate Pain (4 - 6)    
  DEMETRICE LANDRUM  73y, Male    All available historical data reviewed    OVERNIGHT EVENTS:  pain left knee    ROS:  General: Denies rigors, nightsweats  HEENT: Denies headache, rhinorrhea, sore throat, eye pain  CV: Denies CP, palpitations  PULM: Denies wheezing, hemoptysis  GI: Denies hematemesis, hematochezia, melena  : Denies discharge, hematuria  MSK: Denies arthralgias, myalgias  SKIN: Denies rash, lesions  NEURO: Denies paresthesias, weakness  PSYCH: Denies depression, anxiety    VITALS:  T(F): 97.7, Max: 98.7 (02-14-23 @ 14:47)  HR: 91  BP: 153/94  RR: 18Vital Signs Last 24 Hrs  T(C): 36.5 (15 Feb 2023 04:48), Max: 37.1 (14 Feb 2023 14:47)  T(F): 97.7 (15 Feb 2023 04:48), Max: 98.7 (14 Feb 2023 14:47)  HR: 91 (15 Feb 2023 04:48) (75 - 99)  BP: 153/94 (15 Feb 2023 04:48) (133/70 - 153/94)  BP(mean): --  RR: 18 (15 Feb 2023 04:48) (18 - 19)  SpO2: 96% (15 Feb 2023 04:48) (96% - 96%)    Parameters below as of 15 Feb 2023 04:48  Patient On (Oxygen Delivery Method): room air        TESTS & MEASUREMENTS:                        14.4   7.81  )-----------( 250      ( 15 Feb 2023 08:03 )             42.4     02-15    136  |  99  |  13  ----------------------------<  183<H>  3.7   |  25  |  0.8    Ca    9.1      15 Feb 2023 08:03  Mg     2.1     02-15    TPro  6.2  /  Alb  3.5  /  TBili  1.1  /  DBili  x   /  AST  28  /  ALT  22  /  AlkPhos  55  02-15    LIVER FUNCTIONS - ( 15 Feb 2023 08:03 )  Alb: 3.5 g/dL / Pro: 6.2 g/dL / ALK PHOS: 55 U/L / ALT: 22 U/L / AST: 28 U/L / GGT: x             Culture - Joint Fluid (collected 02-12-23 @ 13:30)  Source: Joint Fl Knee  Gram Stain (02-14-23 @ 20:10):    polymorphonuclear leukocytes seen    No organisms seen    by cytocentrifuge    Culture - Urine (collected 02-12-23 @ 12:19)  Source: Clean Catch Clean Catch (Midstream)  Final Report (02-13-23 @ 21:46):    <10,000 CFU/mL Normal Urogenital Tomasa    Culture - Blood (collected 02-12-23 @ 11:16)  Source: .Blood Blood-Peripheral  Preliminary Report (02-13-23 @ 17:02):    No growth to date.    Culture - Blood (collected 02-12-23 @ 11:16)  Source: .Blood Blood-Peripheral  Preliminary Report (02-13-23 @ 17:02):    No growth to date.            RADIOLOGY & ADDITIONAL TESTS:  Personal review of radiological diagnostics performed  Echo and EKG results noted when applicable.     MEDICATIONS:  atenolol  Tablet 100 milliGRAM(s) Oral daily  atorvastatin 20 milliGRAM(s) Oral at bedtime  baclofen 5 milliGRAM(s) Oral <User Schedule>  carbidopa/levodopa  25/250 1 Tablet(s) Oral <User Schedule>  DULoxetine 30 milliGRAM(s) Oral daily  enoxaparin Injectable 40 milliGRAM(s) SubCutaneous every 24 hours  finasteride 5 milliGRAM(s) Oral daily  losartan 50 milliGRAM(s) Oral daily  oxybutynin 5 milliGRAM(s) Oral three times a day  oxycodone    5 mG/acetaminophen 325 mG 1 Tablet(s) Oral every 4 hours PRN  pantoprazole    Tablet 40 milliGRAM(s) Oral before breakfast  predniSONE   Tablet 40 milliGRAM(s) Oral daily  rOPINIRole 1 milliGRAM(s) Oral three times a day  tamsulosin 0.4 milliGRAM(s) Oral at bedtime      ANTIBIOTICS:

## 2023-02-16 NOTE — DISCHARGE NOTE PROVIDER - PROVIDER TOKENS
PROVIDER:[TOKEN:[[id number]],FOLLOWUP:[1 month]],PROVIDER:[TOKEN:[[id number]],FOLLOWUP:[1 week]]

## 2023-02-16 NOTE — DISCHARGE NOTE NURSING/CASE MANAGEMENT/SOCIAL WORK - NSDCPEFALRISK_GEN_ALL_CORE
For information on Fall & Injury Prevention, visit: [email or web address] OR  [email or web address] OR  https://www.cdc.gov/steadi/patient.html

## 2023-02-16 NOTE — DISCHARGE NOTE PROVIDER - NSDCMRMEDTOKEN_GEN_ALL_CORE_FT
atenolol 100 mg oral tablet: 1 tab(s) orally once a day  atorvastatin 20 mg oral tablet: 1 tab(s) orally once a day (at bedtime)  baclofen 5 mg oral tablet: 1 tab(s) orally 3 times a day  carbidopa-levodopa 25 mg-250 mg oral tablet: 1 tab(s) orally 4 times a day at 8AM,12PM,4PM, and 8PM   finasteride 5 mg oral tablet: 1 tab(s) orally once a day  losartan 50 mg oral tablet: 1 tab(s) orally once a day  omeprazole 20 mg oral delayed release capsule: 1 cap(s) orally once a day  oxybutynin 5 mg oral tablet: 1 tab(s) orally 3 times a day  oxycodone-acetaminophen 5 mg-325 mg oral tablet: 1 tab(s) orally every 4 hours, As needed, Moderate Pain (4 - 6)  predniSONE 20 mg oral tablet: 2 tab(s) orally once a day  rOPINIRole 1 mg oral tablet: 1 tab(s) orally 3 times a day  tamsulosin 0.4 mg oral capsule: 1 cap(s) orally once a day (at bedtime)  tiZANidine 2 mg oral tablet: 0.5 tab(s) orally every 8 hours

## 2023-02-16 NOTE — DISCHARGE NOTE PROVIDER - CARE PROVIDER_API CALL
Elaine Lockwood)  Internal Medicine  [address], [address] [place] [address]  Phone: [phone]  Fax: [phone]  Follow Up Time: 1 month    Shilo Crump (DO)  Internal Medicine; Rheumatology  [address] [place] [address]  Phone: [phone]  Fax: [phone]  Follow Up Time: 1 week

## 2023-02-16 NOTE — DISCHARGE NOTE PROVIDER - NSDCCPCAREPLAN_GEN_ALL_CORE_FT
PRINCIPAL DISCHARGE DIAGNOSIS  Diagnosis: Pseudogout  Assessment and Plan of Treatment: Pseudogout is a type of arthritis. It is also called calcium pyrophosphate deposition (CPPD). Pseudogout most often affects the knees. You may also have symptoms in other large joints, including the hip or shoulder. Pseudogout causes calcium crystals to collect in fluid called synovial fluid that surrounds joints. The crystals damage the cartilage and can cause inflammation and pain.  The crystals cannot be removed. The goal of treatment is to reduce pain and swelling, and to maintain joint function.   •Medicines ?NSAIDs, such as ibuprofen, help decrease swelling, pain, and fever. This medicine is available with or without a doctor's order. NSAIDs can cause stomach bleeding or kidney problems in certain people. If you take blood thinner medicine, always ask your healthcare provider if NSAIDs are safe for you. Always read the medicine label and follow directions.  ?Steroids reduce inflammation and can help your joint stiffness and pain during gout attacks.  •Aspiration is a procedure used to drain fluid from around the joint. Your healthcare provider may also inject steroid medicine into the joint after aspiration. Steroids reduce swelling and may relieve pain.  •Surgery may be used to replace a joint if you develop severe damage to the joint.        SECONDARY DISCHARGE DIAGNOSES  Diagnosis: Ambulatory dysfunction  Assessment and Plan of Treatment:

## 2023-02-16 NOTE — DISCHARGE NOTE PROVIDER - HOSPITAL COURSE
HPI:  73-year-old male with history of hypertension, hyperlipidemia, CVA with left-sided weakness, on aspirin, chronic right knee pain with monthly injections by Dr. Lehman of pain management, presents with worsening right knee pain. He states the pain management doctor usually injects him on the medial aspect of his knee, however on Wednesday for the first time he injected him on the lateral aspect. He states the procedure itself was very painful and felt like he was hitting the bone. Since the procedure he has had significantly progressively worsening pain to the area. Reports subjective fever for the past 2 days, last took Tylenol this morning. Denies urinary changes, cough, chest pain, shortness of breath, vomiting, diarrhea, and all other symptoms. On exam, afebrile, hemodynamically stable, saturating well on room air, NAD, nontoxic appearing, appearance of chronic illness.    Knee joint was tapped in the ED. Cultures sent. S/p vanc and zosyn.  Ortho onboard as per ED     (12 Feb 2023 15:34)    patient was started on IV abx for suspected septic arthritis. Studies on knee fluid showed intra and extra-cellular CPP shreya deposits suggesting pseudogout.  patient was started on prednisone and antibiotics were stopped. Patient is medically stable and ready for discharge to [facility].

## 2023-02-17 PROBLEM — E78.5 HYPERLIPIDEMIA, UNSPECIFIED: Chronic | Status: ACTIVE | Noted: 2023-02-13

## 2023-02-17 PROBLEM — I10 ESSENTIAL (PRIMARY) HYPERTENSION: Chronic | Status: ACTIVE | Noted: 2023-02-13

## 2023-02-17 LAB
CULTURE RESULTS: SIGNIFICANT CHANGE UP
CULTURE RESULTS: SIGNIFICANT CHANGE UP
SPECIMEN SOURCE: SIGNIFICANT CHANGE UP
SPECIMEN SOURCE: SIGNIFICANT CHANGE UP

## 2023-02-21 DIAGNOSIS — M25.511 PAIN IN RIGHT SHOULDER: ICD-10-CM

## 2023-02-21 DIAGNOSIS — I10 ESSENTIAL (PRIMARY) HYPERTENSION: ICD-10-CM

## 2023-02-21 DIAGNOSIS — Z79.82 LONG TERM (CURRENT) USE OF ASPIRIN: ICD-10-CM

## 2023-02-21 DIAGNOSIS — M11.261 OTHER CHONDROCALCINOSIS, RIGHT KNEE: ICD-10-CM

## 2023-02-21 DIAGNOSIS — G47.33 OBSTRUCTIVE SLEEP APNEA (ADULT) (PEDIATRIC): ICD-10-CM

## 2023-02-21 DIAGNOSIS — I69.354 HEMIPLEGIA AND HEMIPARESIS FOLLOWING CEREBRAL INFARCTION AFFECTING LEFT NON-DOMINANT SIDE: ICD-10-CM

## 2023-02-21 DIAGNOSIS — F02.80 DEMENTIA IN OTHER DISEASES CLASSIFIED ELSEWHERE, UNSPECIFIED SEVERITY, WITHOUT BEHAVIORAL DISTURBANCE, PSYCHOTIC DISTURBANCE, MOOD DISTURBANCE, AND ANXIETY: ICD-10-CM

## 2023-02-21 DIAGNOSIS — E78.5 HYPERLIPIDEMIA, UNSPECIFIED: ICD-10-CM

## 2023-02-21 DIAGNOSIS — G20 PARKINSON'S DISEASE: ICD-10-CM

## 2023-02-21 DIAGNOSIS — M25.561 PAIN IN RIGHT KNEE: ICD-10-CM

## 2023-02-22 ENCOUNTER — APPOINTMENT (OUTPATIENT)
Dept: RHEUMATOLOGY | Facility: CLINIC | Age: 73
End: 2023-02-22
Payer: MEDICARE

## 2023-02-22 VITALS — BODY MASS INDEX: 25.48 KG/M2 | HEIGHT: 69 IN | WEIGHT: 172 LBS

## 2023-02-22 DIAGNOSIS — Z87.891 PERSONAL HISTORY OF NICOTINE DEPENDENCE: ICD-10-CM

## 2023-02-22 DIAGNOSIS — Z86.69 PERSONAL HISTORY OF OTHER DISEASES OF THE NERVOUS SYSTEM AND SENSE ORGANS: ICD-10-CM

## 2023-02-22 DIAGNOSIS — I10 ESSENTIAL (PRIMARY) HYPERTENSION: ICD-10-CM

## 2023-02-22 DIAGNOSIS — Z86.39 PERSONAL HISTORY OF OTHER ENDOCRINE, NUTRITIONAL AND METABOLIC DISEASE: ICD-10-CM

## 2023-02-22 DIAGNOSIS — Z86.73 PERSONAL HISTORY OF TRANSIENT ISCHEMIC ATTACK (TIA), AND CEREBRAL INFARCTION W/OUT RESIDUAL DEFICITS: ICD-10-CM

## 2023-02-22 PROCEDURE — 99204 OFFICE O/P NEW MOD 45 MIN: CPT

## 2023-02-22 RX ORDER — BACLOFEN 5 MG/1
5 TABLET ORAL
Refills: 0 | Status: ACTIVE | COMMUNITY

## 2023-02-22 RX ORDER — ROPINIROLE 1 MG/1
1 TABLET, FILM COATED ORAL
Refills: 0 | Status: ACTIVE | COMMUNITY

## 2023-02-22 RX ORDER — LOSARTAN POTASSIUM 50 MG/1
50 TABLET, FILM COATED ORAL
Refills: 0 | Status: ACTIVE | COMMUNITY

## 2023-02-22 RX ORDER — TAMSULOSIN HYDROCHLORIDE 0.4 MG/1
0.4 CAPSULE ORAL
Refills: 0 | Status: ACTIVE | COMMUNITY

## 2023-02-22 RX ORDER — OXYBUTYNIN CHLORIDE 5 MG/1
5 TABLET ORAL
Refills: 0 | Status: ACTIVE | COMMUNITY

## 2023-02-22 RX ORDER — OMEPRAZOLE 20 MG/1
20 TABLET, DELAYED RELEASE ORAL
Refills: 0 | Status: ACTIVE | COMMUNITY

## 2023-02-22 RX ORDER — OXYCODONE HYDROCHLORIDE AND ACETAMINOPHEN 5; 325 MG/1; MG/1
5-325 TABLET ORAL
Refills: 0 | Status: ACTIVE | COMMUNITY

## 2023-02-22 RX ORDER — PREDNISONE 20 MG/1
20 TABLET ORAL
Refills: 0 | Status: ACTIVE | COMMUNITY

## 2023-02-22 RX ORDER — ATORVASTATIN CALCIUM 20 MG/1
20 TABLET, FILM COATED ORAL
Refills: 0 | Status: ACTIVE | COMMUNITY

## 2023-02-22 RX ORDER — FINASTERIDE 5 MG/1
5 TABLET, FILM COATED ORAL
Refills: 0 | Status: ACTIVE | COMMUNITY

## 2023-02-22 RX ORDER — ATENOLOL 100 MG/1
100 TABLET ORAL
Refills: 0 | Status: ACTIVE | COMMUNITY

## 2023-02-22 RX ORDER — CARBIDOPA AND LEVODOPA 25; 250 MG/1; MG/1
25-250 TABLET ORAL
Refills: 0 | Status: ACTIVE | COMMUNITY

## 2023-02-22 NOTE — ASSESSMENT
[FreeTextEntry1] : Right knee pseudogout\par -CPPD crystals seen on arthrocentesis while hospitalized\par -Knee swelling improved on prednisone 40 mg \par -Continue prednisone 40 mg x 5 days, 30 mg x 5 days, 20 mg x 5 days, 10 mg x 5 days, 5 mg x 5 days\par -If no improvement with prednisone can consider colchicine or NSAIDs\par -Ice\par -Physical therapy \par -Follow up 4-6 weeks

## 2023-02-22 NOTE — HISTORY OF PRESENT ILLNESS
[FreeTextEntry1] : 73-year-old male with history of hypertension, hyperlipidemia, CVA with left-sided weakness, on aspirin, chronic right knee pain with monthly injections by Dr. Lehman of pain management, admitted with worsening right knee pain 2/12-2/16. His right knee x-ray showed osteoarthritis and arthrocentesis showed 38k WBCs 90% PMN, 7k RBCs with intra and extra cellular CPPD crystals. He was on antibiotics but were stopped. He was started on prednisone 40 mg daily 2/15. Today he says his pain and swelling is better but has pain when he bears weight and walks, which has been limited. He is able to bend his knee. He started PT yesterday and it was postponed today.

## 2023-02-28 LAB
CULTURE RESULTS: SIGNIFICANT CHANGE UP
SPECIMEN SOURCE: SIGNIFICANT CHANGE UP

## 2023-03-30 ENCOUNTER — INPATIENT (INPATIENT)
Facility: HOSPITAL | Age: 73
LOS: 4 days | Discharge: NURSING, REHAB OR LONG-TERM CARE | DRG: 554 | End: 2023-04-04
Attending: INTERNAL MEDICINE | Admitting: INTERNAL MEDICINE
Payer: MEDICARE

## 2023-03-30 VITALS
TEMPERATURE: 96 F | RESPIRATION RATE: 18 BRPM | SYSTOLIC BLOOD PRESSURE: 114 MMHG | HEART RATE: 65 BPM | DIASTOLIC BLOOD PRESSURE: 60 MMHG | OXYGEN SATURATION: 96 %

## 2023-03-30 DIAGNOSIS — R26.2 DIFFICULTY IN WALKING, NOT ELSEWHERE CLASSIFIED: ICD-10-CM

## 2023-03-30 PROCEDURE — 85025 COMPLETE CBC W/AUTO DIFF WBC: CPT

## 2023-03-30 PROCEDURE — 82962 GLUCOSE BLOOD TEST: CPT

## 2023-03-30 PROCEDURE — 84443 ASSAY THYROID STIM HORMONE: CPT

## 2023-03-30 PROCEDURE — U0003: CPT

## 2023-03-30 PROCEDURE — 73560 X-RAY EXAM OF KNEE 1 OR 2: CPT | Mod: 26,RT

## 2023-03-30 PROCEDURE — 73030 X-RAY EXAM OF SHOULDER: CPT | Mod: RT

## 2023-03-30 PROCEDURE — 73562 X-RAY EXAM OF KNEE 3: CPT | Mod: RT

## 2023-03-30 PROCEDURE — 97166 OT EVAL MOD COMPLEX 45 MIN: CPT | Mod: GO

## 2023-03-30 PROCEDURE — 99285 EMERGENCY DEPT VISIT HI MDM: CPT

## 2023-03-30 PROCEDURE — 80061 LIPID PANEL: CPT

## 2023-03-30 PROCEDURE — 70450 CT HEAD/BRAIN W/O DYE: CPT | Mod: 26

## 2023-03-30 PROCEDURE — 83735 ASSAY OF MAGNESIUM: CPT

## 2023-03-30 PROCEDURE — 97162 PT EVAL MOD COMPLEX 30 MIN: CPT | Mod: GP

## 2023-03-30 PROCEDURE — 80053 COMPREHEN METABOLIC PANEL: CPT

## 2023-03-30 PROCEDURE — 86140 C-REACTIVE PROTEIN: CPT

## 2023-03-30 PROCEDURE — U0005: CPT

## 2023-03-30 PROCEDURE — 97110 THERAPEUTIC EXERCISES: CPT | Mod: GP

## 2023-03-30 PROCEDURE — 97530 THERAPEUTIC ACTIVITIES: CPT | Mod: GP

## 2023-03-30 PROCEDURE — 85652 RBC SED RATE AUTOMATED: CPT

## 2023-03-30 PROCEDURE — 83036 HEMOGLOBIN GLYCOSYLATED A1C: CPT

## 2023-03-30 PROCEDURE — 99222 1ST HOSP IP/OBS MODERATE 55: CPT

## 2023-03-30 PROCEDURE — 36415 COLL VENOUS BLD VENIPUNCTURE: CPT

## 2023-03-30 RX ORDER — ROPINIROLE 8 MG/1
1 TABLET, FILM COATED, EXTENDED RELEASE ORAL THREE TIMES A DAY
Refills: 0 | Status: DISCONTINUED | OUTPATIENT
Start: 2023-03-30 | End: 2023-04-04

## 2023-03-30 RX ORDER — PANTOPRAZOLE SODIUM 20 MG/1
40 TABLET, DELAYED RELEASE ORAL
Refills: 0 | Status: DISCONTINUED | OUTPATIENT
Start: 2023-03-30 | End: 2023-04-04

## 2023-03-30 RX ORDER — FINASTERIDE 5 MG/1
5 TABLET, FILM COATED ORAL DAILY
Refills: 0 | Status: DISCONTINUED | OUTPATIENT
Start: 2023-03-30 | End: 2023-04-04

## 2023-03-30 RX ORDER — TAMSULOSIN HYDROCHLORIDE 0.4 MG/1
0.4 CAPSULE ORAL AT BEDTIME
Refills: 0 | Status: DISCONTINUED | OUTPATIENT
Start: 2023-03-30 | End: 2023-04-04

## 2023-03-30 RX ORDER — ACETAMINOPHEN 500 MG
650 TABLET ORAL EVERY 6 HOURS
Refills: 0 | Status: DISCONTINUED | OUTPATIENT
Start: 2023-03-30 | End: 2023-04-04

## 2023-03-30 RX ORDER — LANOLIN ALCOHOL/MO/W.PET/CERES
3 CREAM (GRAM) TOPICAL AT BEDTIME
Refills: 0 | Status: DISCONTINUED | OUTPATIENT
Start: 2023-03-30 | End: 2023-04-04

## 2023-03-30 RX ORDER — ATORVASTATIN CALCIUM 80 MG/1
20 TABLET, FILM COATED ORAL AT BEDTIME
Refills: 0 | Status: DISCONTINUED | OUTPATIENT
Start: 2023-03-30 | End: 2023-04-04

## 2023-03-30 RX ORDER — BACLOFEN 100 %
5 POWDER (GRAM) MISCELLANEOUS EVERY 8 HOURS
Refills: 0 | Status: DISCONTINUED | OUTPATIENT
Start: 2023-03-30 | End: 2023-04-04

## 2023-03-30 RX ORDER — CARBIDOPA AND LEVODOPA 25; 100 MG/1; MG/1
1 TABLET ORAL
Refills: 0 | Status: DISCONTINUED | OUTPATIENT
Start: 2023-03-30 | End: 2023-04-04

## 2023-03-30 RX ORDER — LOSARTAN POTASSIUM 100 MG/1
50 TABLET, FILM COATED ORAL DAILY
Refills: 0 | Status: DISCONTINUED | OUTPATIENT
Start: 2023-03-30 | End: 2023-04-04

## 2023-03-30 RX ORDER — ATENOLOL 25 MG/1
100 TABLET ORAL DAILY
Refills: 0 | Status: DISCONTINUED | OUTPATIENT
Start: 2023-03-30 | End: 2023-04-04

## 2023-03-30 RX ORDER — ONDANSETRON 8 MG/1
4 TABLET, FILM COATED ORAL EVERY 8 HOURS
Refills: 0 | Status: DISCONTINUED | OUTPATIENT
Start: 2023-03-30 | End: 2023-04-04

## 2023-03-30 RX ORDER — OXYBUTYNIN CHLORIDE 5 MG
5 TABLET ORAL THREE TIMES A DAY
Refills: 0 | Status: DISCONTINUED | OUTPATIENT
Start: 2023-03-30 | End: 2023-04-04

## 2023-03-30 RX ADMIN — CARBIDOPA AND LEVODOPA 1 TABLET(S): 25; 100 TABLET ORAL at 22:23

## 2023-03-30 RX ADMIN — TAMSULOSIN HYDROCHLORIDE 0.4 MILLIGRAM(S): 0.4 CAPSULE ORAL at 22:22

## 2023-03-30 RX ADMIN — ROPINIROLE 1 MILLIGRAM(S): 8 TABLET, FILM COATED, EXTENDED RELEASE ORAL at 22:23

## 2023-03-30 RX ADMIN — Medication 5 MILLIGRAM(S): at 22:23

## 2023-03-30 RX ADMIN — CARBIDOPA AND LEVODOPA 1 TABLET(S): 25; 100 TABLET ORAL at 18:32

## 2023-03-30 RX ADMIN — ATORVASTATIN CALCIUM 20 MILLIGRAM(S): 80 TABLET, FILM COATED ORAL at 22:22

## 2023-03-30 NOTE — H&P ADULT - HISTORY OF PRESENT ILLNESS
73-year-old male with history of hypertension, hyperlipidemia, CVA with left-sided weakness, on aspirin, chronic right knee pain with monthly injections by Dr. Lehman of pain management, presents with complaint of weakness and difficulty ambulating. Of note patient was recently admitted from 02/12/23 to 02/16/23 for painful joint which was tapped with cultures sent. Knee fluid studies at the time showed intra and extra-cellular CPP shreya deposits suggesting pseudogout. patient was discharged to rehab on prednisone to follow up with rheumatology  He was discharged from rehab 2 weeks ago and since then he endorsed weakness and inability to ambulate for about a week. Denies trauma, numbness or tingling, fall,  urinary changes, cough, chest pain, shortness of breath, vomiting, diarrhea, and all other symptoms.    In the ED   Vital Signs Last 24 Hrs  T(C): 35.8 (30 Mar 2023 16:01), Max: 35.8 (30 Mar 2023 16:01)  T(F): 96.5 (30 Mar 2023 16:01), Max: 96.5 (30 Mar 2023 16:01)  HR: 65 (30 Mar 2023 16:01) (65 - 65)  BP: 114/60 (30 Mar 2023 16:01) (114/60 - 114/60)  BP(mean): --  RR: 18 (30 Mar 2023 16:01) (18 - 18)  SpO2: 96% (30 Mar 2023 16:01) (96% - 96%)    Parameters below as of 30 Mar 2023 16:01  Patient On (Oxygen Delivery Method): room air    CTH showed No acute intracranial pathology. No evidence of midline shift, mass effect or intracranial hemorrhage.  Xray Right Knee showed No evidence of acute fracture or dislocation.  Labs were unremarkable.     Patient is being admitted for further management              73-year-old male with history of hypertension, hyperlipidemia, CVA with left-sided weakness, on aspirin, chronic right knee pain with monthly injections by Dr. Lehman of pain management, presents with complaint of weakness and difficulty ambulating. Of note patient was recently admitted from 02/12/23 to 02/16/23 for painful joint s/p arthrocentesis. Knee fluid studies at the time showed intra and extra-cellular CPP shreya deposits suggesting pseudogout. patient was discharged to rehab on prednisone to follow up with rheumatology  He was discharged from rehab 2 weeks ago and since then he endorsed weakness and inability to ambulate for about a week. He endorses continuous right knee pain and states that this make it difficult to perform his ADLs. Patient also stated that he had a fall (unclear about when) when he went up to use the bathroom and tried to get up but fell. He denied any head trauma or LOC at that time.  Patient lives alone and is having difficulties caring from himself. Denies numbness or tingling, fall in the past 24 hrs,  urinary changes, cough, chest pain, shortness of breath, vomiting, diarrhea, and all other symptoms.     In the ED   Vital Signs Last 24 Hrs  T(C): 35.8 (30 Mar 2023 16:01), Max: 35.8 (30 Mar 2023 16:01)  T(F): 96.5 (30 Mar 2023 16:01), Max: 96.5 (30 Mar 2023 16:01)  HR: 65 (30 Mar 2023 16:01) (65 - 65)  BP: 114/60 (30 Mar 2023 16:01) (114/60 - 114/60)  BP(mean): --  RR: 18 (30 Mar 2023 16:01) (18 - 18)  SpO2: 96% (30 Mar 2023 16:01) (96% - 96%)    Parameters below as of 30 Mar 2023 16:01  Patient On (Oxygen Delivery Method): room air    CTH showed No acute intracranial pathology. No evidence of midline shift, mass effect or intracranial hemorrhage.  Xray Right Knee showed No evidence of acute fracture or dislocation.  Labs were unremarkable.     Patient is being admitted for further management

## 2023-03-30 NOTE — H&P ADULT - ASSESSMENT
73-year-old male with history of hypertension, hyperlipidemia, CVA with left-sided weakness, on aspirin, chronic right knee pain with monthly injections by Dr. Lehman of pain management, presents with weakness and difficulty to ambulate     #Generalized weakness  *Afebrile, no leukocytosis   - CTH - unremarkable   - No signs fluid overload on PE  - f/u TSH, CXR  - f/u UA, UCx  - obtain PT/OT   - Electrolyte wnl, replete as needed      # Right Knee Pain  #Pseudogout   Right knee swelling  -Patient had right knee xray showing degenerative changes, , arthrocentesis showing 38,431 cells, 90% PMN, intra and extracellular CPPD crystals, gram stain negative, uric acid 4.7. -Continue prednisone 40 mg daily until symptoms resolve  -He can follow up with me within 1 week of discharge       # Parkinsons disease  # Hx of CVA with residual left sided weakness  - C/w sinemet and roprinole  - C/w baclofen for muscle rigidity  - Patient not on ASA or plavix  - C/w duloxirtine  - PT eval  - Activity: IAT      # HTN  - C/w hydrochlorthiazide and atenolol    # DL  - C/w atorvastatin    Diet: DASH  Activity: IAT  DVT Prophylaxis: lovenox  GI Prophylaxis: pantoprazole  CHG Order  Code Status: Full  Disposition: medicine   73-year-old male with history of hypertension, hyperlipidemia, CVA with left-sided weakness, on aspirin, chronic right knee pain with monthly injections by Dr. Lehman of pain management, presents with weakness and difficulty to ambulate     * Please verify pt's meds; he states he has been on the same meds since last admission; His pharmacy is [facility]; pharmacy closed at this time*    #Generalized weakness  #Inability to ambulate  *Afebrile, no leukocytosis   - CTH - unremarkable   - No signs fluid overload on PE  - Electrolyte wnl, replete as needed  - obtain PT/OT , TSH      #Right Knee Pain  #Pseudogout   *LAST ADMISSION: Patient had right knee xray showing degenerative changes, , arthrocentesis showing 38,431 cells, 90% PMN, intra and extracellular CPPD crystals, gram stain negative, uric acid 4.7. per rheum: Continue prednisone 40 mg daily until symptoms resolve  - Pt is no longer taking prednisone  - Xray Right Knee showed No evidence of acute fracture or dislocation.  - Ortho eval for right knee      # Parkinsons disease  # Hx of CVA with residual left sided weakness  - C/w sinemet and roprinole  - C/w baclofen for muscle rigidity  - PT eval  - Activity: IAT    # HTN  - C/w Losartan  and atenolol    # DL  - C/w atorvastatin    Diet: DASH  Activity: IAT  DVT Prophylaxis: lovenox  GI Prophylaxis: pantoprazole  Code Status: Full  Disposition: medicine

## 2023-03-30 NOTE — ED ADULT NURSE NOTE - CHIEF COMPLAINT QUOTE
BIBA and friend, c/o worsening parkinsons, unable to ambulate since d/c from [facility] 2 wks ago. fs triage 135 + R Knee pain

## 2023-03-30 NOTE — H&P ADULT - NSHPLABSRESULTS_GEN_ALL_CORE
LABS:                        13.7   4.72  )-----------( 195      ( 30 Mar 2023 12:40 )             41.0     Hb Trend: 13.7<--  WBC Trend: 4.72<--  Plt Trend: 195<--          03-30    143  |  99  |  16  ----------------------------<  139<H>  3.6   |  31  |  0.9    Ca    9.0      30 Mar 2023 10:30  Phos  3.0     03-30  Mg     2.2     03-30    TPro  6.8  /  Alb  3.9  /  TBili  0.9  /  DBili  x   /  AST  28  /  ALT  6   /  AlkPhos  72  03-30          CAPILLARY BLOOD GLUCOSE      POCT Blood Glucose.: 135 mg/dL (30 Mar 2023 08:28)          IMAGING:  reviewed

## 2023-03-30 NOTE — PATIENT PROFILE ADULT - FALL HARM RISK - HARM RISK INTERVENTIONS

## 2023-03-30 NOTE — ED ADULT TRIAGE NOTE - CHIEF COMPLAINT QUOTE
CP x 2 days with headache starting 2 weeks ago worse x 2 dyas. no ASA PTA
BIBA and friend, c/o worsening parkinsons, unable to ambulate since d/c from [facility] 2 wks ago. fs triage 135 + R Knee pain

## 2023-03-30 NOTE — H&P ADULT - NSHPPHYSICALEXAM_GEN_ALL_CORE
GENERAL: NAD, speaks in full sentences, no signs of respiratory distress  HEAD:  Atraumatic, Normocephalic  EYES: EOMI, PERRLA, anicteric sclera  NECK: Supple, No JVD  CHEST/LUNG: Clear to auscultation bilaterally; No wheeze; No crackles; No accessory muscles used  HEART: Regular rate and rhythm; No murmurs;   ABDOMEN: Soft, Nontender, Nondistended; Bowel sounds present; No guarding  EXTREMITIES:  2+ Peripheral Pulses, No cyanosis or edema  PSYCH: AAOx3  NEUROLOGY: non-focal  SKIN: No rashes or lesions GENERAL: NAD,d ifficult to understand  HEAD:  Atraumatic, Normocephalic  EYES: EOMI,   NECK: Supple, No JVD  CHEST/LUNG: Clear to auscultation bilaterally; No accessory muscles used  HEART: Regular rate and rhythm; No murmurs;   ABDOMEN: Soft, Nontender, Nondistended;  No guarding  EXTREMITIES: No cyanosis or edema   PSYCH: AAOx3  NEUROLOGY: non-focal  SKIN: No rashes or lesions

## 2023-03-30 NOTE — H&P ADULT - ATTENDING COMMENTS
73-year-old male with history of hypertension, hyperlipidemia, CVA with left-sided weakness, on aspirin, chronic right knee pain with monthly injections by Dr. Lehman of pain management, presents with weakness and difficulty to ambulate     1. Generalized weakness associated with inability to ambulate due to acute right knee pain   *Afebrile, no leukocytosis   - Admit to medicine                        - CTH - unremarkable               - TSH:pending   - Ua:pending                              - CXR:pending   - Right knee x-ray: degenerative changes   * Last admission Arthrocentesis: WBC: 38 432, 90% PMN, gram stain negative, CPPD crystals noted   - Ortho consult:pending   - PT eval:pending         2.Parkinsons disease/ Hx of CVA with residual left sided weakness  - C/w sinemet and roprinole  - C/w baclofen for muscle rigidity  - Patient not on ASA or plavix  - C/w duloxirtine      3.HTN  - C/w hydrochlorthiazide and atenolol    4. DL  - C/w atorvastatin    Diet: DASH  Activity: IAT  DVT Prophylaxis: lovenox  GI Prophylaxis: pantoprazole  CHG Order  Code Status: Full  Disposition: medicine 73-year-old male with history of hypertension, hyperlipidemia, CVA with left-sided weakness, on aspirin, chronic right knee pain with monthly injections by Dr. Lehman of pain management, presents with weakness and difficulty to ambulate     1. Generalized weakness associated with inability to ambulate due to acute right knee pain   *Afebrile, no leukocytosis   - Admit to medicine                        - CTH - unremarkable               - TSH:pending   - Ua:pending                              - CXR:pending   - Right knee x-ray: degenerative changes   * Last admission Arthrocentesis: WBC: 38 432, 90% PMN, gram stain negative, CPPD crystals noted   - Ortho consult:pending   - PT eval:pending     2.Parkinsons disease/ Hx of CVA with residual left sided weakness  - Cont home meds   - Patient not on ASA or plavix  - C/w duloxirtine    3.HTN  - C/w hydrochlorthiazide and atenolol    4. DL  - C/w atorvastatin    Diet: DASH  Activity: IAT  DVT Prophylaxis: lovenox  GI Prophylaxis: pantoprazole  CHG Order  Code Status: Full  Disposition: medicine

## 2023-03-30 NOTE — PATIENT PROFILE ADULT - VISION (WITH CORRECTIVE LENSES IF THE PATIENT USUALLY WEARS THEM):
Reason for Call:  Other prescription    Detailed comments: Patient was seen yesterday. Would like a call back to discuss medication.    Phone Number Patient can be reached at:  [phone]       Best Time: any    Can we leave a detailed message on this number? YES    Call taken on 1/19/2017 at 11:04 AM by Temi Todd      
Spoke with patient, she is afraid to start the [place] after reading the pamphlet that was sent home with her. She asked if Dr. Del Cid could call her so she could ask him a couple questions. Dr. Del Cid stated he will call her. Joan Bucio CMA    
Normal vision: sees adequately in most situations; can see medication labels, newsprint

## 2023-03-31 LAB
A1C WITH ESTIMATED AVERAGE GLUCOSE RESULT: 7.3 % — HIGH (ref 4–5.6)
ALBUMIN SERPL ELPH-MCNC: 3.5 G/DL — SIGNIFICANT CHANGE UP (ref 3.5–5.2)
ALP SERPL-CCNC: 65 U/L — SIGNIFICANT CHANGE UP (ref 30–115)
ALT FLD-CCNC: 9 U/L — SIGNIFICANT CHANGE UP (ref 0–41)
ANION GAP SERPL CALC-SCNC: 13 MMOL/L — SIGNIFICANT CHANGE UP (ref 7–14)
AST SERPL-CCNC: 19 U/L — SIGNIFICANT CHANGE UP (ref 0–41)
BASOPHILS # BLD AUTO: 0.06 K/UL — SIGNIFICANT CHANGE UP (ref 0–0.2)
BASOPHILS NFR BLD AUTO: 1.5 % — HIGH (ref 0–1)
BILIRUB SERPL-MCNC: 0.7 MG/DL — SIGNIFICANT CHANGE UP (ref 0.2–1.2)
BUN SERPL-MCNC: 16 MG/DL — SIGNIFICANT CHANGE UP (ref 10–20)
CALCIUM SERPL-MCNC: 8.9 MG/DL — SIGNIFICANT CHANGE UP (ref 8.4–10.5)
CHLORIDE SERPL-SCNC: 99 MMOL/L — SIGNIFICANT CHANGE UP (ref 98–110)
CHOLEST SERPL-MCNC: 100 MG/DL — SIGNIFICANT CHANGE UP
CO2 SERPL-SCNC: 29 MMOL/L — SIGNIFICANT CHANGE UP (ref 17–32)
CREAT SERPL-MCNC: 0.8 MG/DL — SIGNIFICANT CHANGE UP (ref 0.7–1.5)
CRP SERPL-MCNC: 4.8 MG/L — HIGH
EGFR: 94 ML/MIN/1.73M2 — SIGNIFICANT CHANGE UP
EOSINOPHIL # BLD AUTO: 0.43 K/UL — SIGNIFICANT CHANGE UP (ref 0–0.7)
EOSINOPHIL NFR BLD AUTO: 10.5 % — HIGH (ref 0–8)
ERYTHROCYTE [SEDIMENTATION RATE] IN BLOOD: 33 MM/HR — HIGH (ref 0–10)
ESTIMATED AVERAGE GLUCOSE: 163 MG/DL — HIGH (ref 68–114)
GLUCOSE BLDC GLUCOMTR-MCNC: 145 MG/DL — HIGH (ref 70–99)
GLUCOSE BLDC GLUCOMTR-MCNC: 148 MG/DL — HIGH (ref 70–99)
GLUCOSE BLDC GLUCOMTR-MCNC: 178 MG/DL — HIGH (ref 70–99)
GLUCOSE SERPL-MCNC: 149 MG/DL — HIGH (ref 70–99)
HCT VFR BLD CALC: 40.3 % — LOW (ref 42–52)
HDLC SERPL-MCNC: 27 MG/DL — LOW
HGB BLD-MCNC: 13.2 G/DL — LOW (ref 14–18)
IMM GRANULOCYTES NFR BLD AUTO: 0.2 % — SIGNIFICANT CHANGE UP (ref 0.1–0.3)
LIPID PNL WITH DIRECT LDL SERPL: 48 MG/DL — SIGNIFICANT CHANGE UP
LYMPHOCYTES # BLD AUTO: 1.51 K/UL — SIGNIFICANT CHANGE UP (ref 1.2–3.4)
LYMPHOCYTES # BLD AUTO: 37 % — SIGNIFICANT CHANGE UP (ref 20.5–51.1)
MAGNESIUM SERPL-MCNC: 2.1 MG/DL — SIGNIFICANT CHANGE UP (ref 1.8–2.4)
MCHC RBC-ENTMCNC: 29.5 PG — SIGNIFICANT CHANGE UP (ref 27–31)
MCHC RBC-ENTMCNC: 32.8 G/DL — SIGNIFICANT CHANGE UP (ref 32–37)
MCV RBC AUTO: 90 FL — SIGNIFICANT CHANGE UP (ref 80–94)
MONOCYTES # BLD AUTO: 0.45 K/UL — SIGNIFICANT CHANGE UP (ref 0.1–0.6)
MONOCYTES NFR BLD AUTO: 11 % — HIGH (ref 1.7–9.3)
NEUTROPHILS # BLD AUTO: 1.62 K/UL — SIGNIFICANT CHANGE UP (ref 1.4–6.5)
NEUTROPHILS NFR BLD AUTO: 39.8 % — LOW (ref 42.2–75.2)
NON HDL CHOLESTEROL: 73 MG/DL — SIGNIFICANT CHANGE UP
NRBC # BLD: 0 /100 WBCS — SIGNIFICANT CHANGE UP (ref 0–0)
PLATELET # BLD AUTO: 192 K/UL — SIGNIFICANT CHANGE UP (ref 130–400)
POTASSIUM SERPL-MCNC: 3.3 MMOL/L — LOW (ref 3.5–5)
POTASSIUM SERPL-SCNC: 3.3 MMOL/L — LOW (ref 3.5–5)
PROT SERPL-MCNC: 5.8 G/DL — LOW (ref 6–8)
RBC # BLD: 4.48 M/UL — LOW (ref 4.7–6.1)
RBC # FLD: 13 % — SIGNIFICANT CHANGE UP (ref 11.5–14.5)
SODIUM SERPL-SCNC: 141 MMOL/L — SIGNIFICANT CHANGE UP (ref 135–146)
TRIGL SERPL-MCNC: 122 MG/DL — SIGNIFICANT CHANGE UP
TSH SERPL-MCNC: 0.77 UIU/ML — SIGNIFICANT CHANGE UP (ref 0.27–4.2)
WBC # BLD: 4.08 K/UL — LOW (ref 4.8–10.8)
WBC # FLD AUTO: 4.08 K/UL — LOW (ref 4.8–10.8)

## 2023-03-31 PROCEDURE — 99232 SBSQ HOSP IP/OBS MODERATE 35: CPT

## 2023-03-31 PROCEDURE — 73030 X-RAY EXAM OF SHOULDER: CPT | Mod: 26,RT

## 2023-03-31 PROCEDURE — 73562 X-RAY EXAM OF KNEE 3: CPT | Mod: 26,RT

## 2023-03-31 RX ORDER — INSULIN LISPRO 100/ML
VIAL (ML) SUBCUTANEOUS
Refills: 0 | Status: DISCONTINUED | OUTPATIENT
Start: 2023-03-31 | End: 2023-04-04

## 2023-03-31 RX ORDER — DEXTROSE 50 % IN WATER 50 %
25 SYRINGE (ML) INTRAVENOUS ONCE
Refills: 0 | Status: DISCONTINUED | OUTPATIENT
Start: 2023-03-31 | End: 2023-04-04

## 2023-03-31 RX ORDER — OXYCODONE HYDROCHLORIDE 5 MG/1
10 TABLET ORAL EVERY 6 HOURS
Refills: 0 | Status: DISCONTINUED | OUTPATIENT
Start: 2023-03-31 | End: 2023-04-04

## 2023-03-31 RX ORDER — TRAMADOL HYDROCHLORIDE 50 MG/1
50 TABLET ORAL EVERY 6 HOURS
Refills: 0 | Status: DISCONTINUED | OUTPATIENT
Start: 2023-03-31 | End: 2023-03-31

## 2023-03-31 RX ORDER — GLUCAGON INJECTION, SOLUTION 0.5 MG/.1ML
1 INJECTION, SOLUTION SUBCUTANEOUS ONCE
Refills: 0 | Status: DISCONTINUED | OUTPATIENT
Start: 2023-03-31 | End: 2023-04-04

## 2023-03-31 RX ORDER — SODIUM CHLORIDE 9 MG/ML
1000 INJECTION, SOLUTION INTRAVENOUS
Refills: 0 | Status: DISCONTINUED | OUTPATIENT
Start: 2023-03-31 | End: 2023-04-04

## 2023-03-31 RX ORDER — DEXTROSE 50 % IN WATER 50 %
12.5 SYRINGE (ML) INTRAVENOUS ONCE
Refills: 0 | Status: DISCONTINUED | OUTPATIENT
Start: 2023-03-31 | End: 2023-04-04

## 2023-03-31 RX ORDER — ENOXAPARIN SODIUM 100 MG/ML
40 INJECTION SUBCUTANEOUS EVERY 24 HOURS
Refills: 0 | Status: DISCONTINUED | OUTPATIENT
Start: 2023-03-31 | End: 2023-04-04

## 2023-03-31 RX ORDER — DEXTROSE 50 % IN WATER 50 %
15 SYRINGE (ML) INTRAVENOUS ONCE
Refills: 0 | Status: DISCONTINUED | OUTPATIENT
Start: 2023-03-31 | End: 2023-04-04

## 2023-03-31 RX ADMIN — CARBIDOPA AND LEVODOPA 1 TABLET(S): 25; 100 TABLET ORAL at 17:10

## 2023-03-31 RX ADMIN — Medication 5 MILLIGRAM(S): at 21:33

## 2023-03-31 RX ADMIN — ROPINIROLE 1 MILLIGRAM(S): 8 TABLET, FILM COATED, EXTENDED RELEASE ORAL at 05:45

## 2023-03-31 RX ADMIN — ROPINIROLE 1 MILLIGRAM(S): 8 TABLET, FILM COATED, EXTENDED RELEASE ORAL at 21:34

## 2023-03-31 RX ADMIN — Medication 5 MILLIGRAM(S): at 14:01

## 2023-03-31 RX ADMIN — LOSARTAN POTASSIUM 50 MILLIGRAM(S): 100 TABLET, FILM COATED ORAL at 05:44

## 2023-03-31 RX ADMIN — CARBIDOPA AND LEVODOPA 1 TABLET(S): 25; 100 TABLET ORAL at 14:01

## 2023-03-31 RX ADMIN — Medication 5 MILLIGRAM(S): at 05:44

## 2023-03-31 RX ADMIN — ROPINIROLE 1 MILLIGRAM(S): 8 TABLET, FILM COATED, EXTENDED RELEASE ORAL at 14:04

## 2023-03-31 RX ADMIN — ENOXAPARIN SODIUM 40 MILLIGRAM(S): 100 INJECTION SUBCUTANEOUS at 11:00

## 2023-03-31 RX ADMIN — CARBIDOPA AND LEVODOPA 1 TABLET(S): 25; 100 TABLET ORAL at 05:44

## 2023-03-31 RX ADMIN — Medication 5 MILLIGRAM(S): at 14:04

## 2023-03-31 RX ADMIN — TAMSULOSIN HYDROCHLORIDE 0.4 MILLIGRAM(S): 0.4 CAPSULE ORAL at 21:33

## 2023-03-31 RX ADMIN — ATENOLOL 100 MILLIGRAM(S): 25 TABLET ORAL at 05:44

## 2023-03-31 RX ADMIN — PANTOPRAZOLE SODIUM 40 MILLIGRAM(S): 20 TABLET, DELAYED RELEASE ORAL at 05:44

## 2023-03-31 RX ADMIN — ATORVASTATIN CALCIUM 20 MILLIGRAM(S): 80 TABLET, FILM COATED ORAL at 21:33

## 2023-03-31 RX ADMIN — Medication 5 MILLIGRAM(S): at 05:45

## 2023-03-31 RX ADMIN — Medication 2: at 12:04

## 2023-03-31 RX ADMIN — Medication 5 MILLIGRAM(S): at 21:34

## 2023-03-31 RX ADMIN — FINASTERIDE 5 MILLIGRAM(S): 5 TABLET, FILM COATED ORAL at 12:05

## 2023-03-31 NOTE — CHART NOTE - NSCHARTNOTEFT_GEN_A_CORE
Patient was admitted for difficulty ambulating and right knee pain. his right knee had mild bruises but didn't report any fall.   it's slightly swollen. it was noted that patient had pseudogout last month.   So, plan:   1) control pain with anti-inflammatory and tylenol.   2) Right knee x-ray   3) CRP  4) Ortho consult for possible tap to rule out recurrence of pseudogout     Plan communicated with resident

## 2023-03-31 NOTE — PHYSICAL THERAPY INITIAL EVALUATION ADULT - RANGE OF MOTION EXAMINATION, REHAB EVAL
B UE's grossly WFL w/ complaint of R shoulder flex with mobility. BLE's grossly WFL with c/o R lateral knee pain with palpation

## 2023-03-31 NOTE — PHYSICAL THERAPY INITIAL EVALUATION ADULT - NSACTIVITYREC_GEN_A_PT
[facility]: marching, LAQ, ankle pumps, 10 reps each.

## 2023-03-31 NOTE — PHYSICAL THERAPY INITIAL EVALUATION ADULT - GENERAL OBSERVATIONS, REHAB EVAL
Called pt and she stated that she wanted to reschedule the appt for 2/25/20.  Rescheduled for 3/24/20
Patient has decided not to take the diet medication per Dr. Alexander Bill her primary care physician. She is wondering if it is necessary to keep her appt. On 2/25/20 due to this.     Please return call  Thanks
Pt encountered semifowler in bed in NAD, c/o pain in R lateral knee 5/10 and R shoulder pain with mobility. Pt agreeable to PT.

## 2023-03-31 NOTE — PHYSICAL THERAPY INITIAL EVALUATION ADULT - ADDITIONAL COMMENTS
Pt lives with friend in , [address], bedroom/bathroom on first floor. Pt reports he has a shower chair and grab bars in tub shower.

## 2023-03-31 NOTE — PHYSICAL THERAPY INITIAL EVALUATION ADULT - PERTINENT HX OF CURRENT PROBLEM, REHAB EVAL
73-year-old male with history of hypertension, hyperlipidemia, CVA with left-sided weakness, on aspirin, chronic right knee pain with monthly injections by Dr. Lehman of pain management, presents with complaint of weakness and difficulty ambulating. Of note patient was recently admitted from 02/12/23 to 02/16/23 for painful joint s/p arthrocentesis. Knee fluid studies at the time showed intra and extra-cellular CPP shreya deposits suggesting pseudogout. patient was discharged to rehab on prednisone to follow up with rheumatology  He was discharged from rehab 2 weeks ago and since then he endorsed weakness and inability to ambulate for about a week. He endorses continuous right knee pain and states that this make it difficult to perform his ADLs. Patient also stated that he had a fall (unclear about when) when he went up to use the bathroom and tried to get up but fell. He denied any head trauma or LOC at that time.  Patient lives alone and is having difficulties caring from himself. Denies numbness or tingling, fall in the past 24 hrs,  urinary changes, cough, chest pain, shortness of breath, vomiting, diarrhea, and all other symptoms.

## 2023-04-01 LAB
ALBUMIN SERPL ELPH-MCNC: 3.5 G/DL — SIGNIFICANT CHANGE UP (ref 3.5–5.2)
ALP SERPL-CCNC: 62 U/L — SIGNIFICANT CHANGE UP (ref 30–115)
ALT FLD-CCNC: 8 U/L — SIGNIFICANT CHANGE UP (ref 0–41)
ANION GAP SERPL CALC-SCNC: 12 MMOL/L — SIGNIFICANT CHANGE UP (ref 7–14)
AST SERPL-CCNC: 19 U/L — SIGNIFICANT CHANGE UP (ref 0–41)
BASOPHILS # BLD AUTO: 0.08 K/UL — SIGNIFICANT CHANGE UP (ref 0–0.2)
BASOPHILS NFR BLD AUTO: 1.6 % — HIGH (ref 0–1)
BILIRUB SERPL-MCNC: 0.8 MG/DL — SIGNIFICANT CHANGE UP (ref 0.2–1.2)
BUN SERPL-MCNC: 20 MG/DL — SIGNIFICANT CHANGE UP (ref 10–20)
CALCIUM SERPL-MCNC: 9 MG/DL — SIGNIFICANT CHANGE UP (ref 8.4–10.5)
CHLORIDE SERPL-SCNC: 97 MMOL/L — LOW (ref 98–110)
CO2 SERPL-SCNC: 31 MMOL/L — SIGNIFICANT CHANGE UP (ref 17–32)
CREAT SERPL-MCNC: 1.3 MG/DL — SIGNIFICANT CHANGE UP (ref 0.7–1.5)
EGFR: 58 ML/MIN/1.73M2 — LOW
EOSINOPHIL # BLD AUTO: 0.5 K/UL — SIGNIFICANT CHANGE UP (ref 0–0.7)
EOSINOPHIL NFR BLD AUTO: 9.7 % — HIGH (ref 0–8)
GLUCOSE BLDC GLUCOMTR-MCNC: 119 MG/DL — HIGH (ref 70–99)
GLUCOSE BLDC GLUCOMTR-MCNC: 132 MG/DL — HIGH (ref 70–99)
GLUCOSE BLDC GLUCOMTR-MCNC: 165 MG/DL — HIGH (ref 70–99)
GLUCOSE SERPL-MCNC: 133 MG/DL — HIGH (ref 70–99)
HCT VFR BLD CALC: 40.8 % — LOW (ref 42–52)
HGB BLD-MCNC: 13.3 G/DL — LOW (ref 14–18)
IMM GRANULOCYTES NFR BLD AUTO: 0.2 % — SIGNIFICANT CHANGE UP (ref 0.1–0.3)
LYMPHOCYTES # BLD AUTO: 1.6 K/UL — SIGNIFICANT CHANGE UP (ref 1.2–3.4)
LYMPHOCYTES # BLD AUTO: 31 % — SIGNIFICANT CHANGE UP (ref 20.5–51.1)
MAGNESIUM SERPL-MCNC: 2 MG/DL — SIGNIFICANT CHANGE UP (ref 1.8–2.4)
MCHC RBC-ENTMCNC: 29.2 PG — SIGNIFICANT CHANGE UP (ref 27–31)
MCHC RBC-ENTMCNC: 32.6 G/DL — SIGNIFICANT CHANGE UP (ref 32–37)
MCV RBC AUTO: 89.7 FL — SIGNIFICANT CHANGE UP (ref 80–94)
MONOCYTES # BLD AUTO: 0.6 K/UL — SIGNIFICANT CHANGE UP (ref 0.1–0.6)
MONOCYTES NFR BLD AUTO: 11.6 % — HIGH (ref 1.7–9.3)
NEUTROPHILS # BLD AUTO: 2.37 K/UL — SIGNIFICANT CHANGE UP (ref 1.4–6.5)
NEUTROPHILS NFR BLD AUTO: 45.9 % — SIGNIFICANT CHANGE UP (ref 42.2–75.2)
NRBC # BLD: 0 /100 WBCS — SIGNIFICANT CHANGE UP (ref 0–0)
PLATELET # BLD AUTO: 185 K/UL — SIGNIFICANT CHANGE UP (ref 130–400)
POTASSIUM SERPL-MCNC: 3.6 MMOL/L — SIGNIFICANT CHANGE UP (ref 3.5–5)
POTASSIUM SERPL-SCNC: 3.6 MMOL/L — SIGNIFICANT CHANGE UP (ref 3.5–5)
PROT SERPL-MCNC: 5.8 G/DL — LOW (ref 6–8)
RBC # BLD: 4.55 M/UL — LOW (ref 4.7–6.1)
RBC # FLD: 12.9 % — SIGNIFICANT CHANGE UP (ref 11.5–14.5)
SODIUM SERPL-SCNC: 140 MMOL/L — SIGNIFICANT CHANGE UP (ref 135–146)
WBC # BLD: 5.16 K/UL — SIGNIFICANT CHANGE UP (ref 4.8–10.8)
WBC # FLD AUTO: 5.16 K/UL — SIGNIFICANT CHANGE UP (ref 4.8–10.8)

## 2023-04-01 PROCEDURE — 99232 SBSQ HOSP IP/OBS MODERATE 35: CPT

## 2023-04-01 RX ADMIN — CARBIDOPA AND LEVODOPA 1 TABLET(S): 25; 100 TABLET ORAL at 11:39

## 2023-04-01 RX ADMIN — Medication 5 MILLIGRAM(S): at 05:24

## 2023-04-01 RX ADMIN — Medication 5 MILLIGRAM(S): at 21:42

## 2023-04-01 RX ADMIN — Medication 5 MILLIGRAM(S): at 21:41

## 2023-04-01 RX ADMIN — CARBIDOPA AND LEVODOPA 1 TABLET(S): 25; 100 TABLET ORAL at 17:07

## 2023-04-01 RX ADMIN — OXYCODONE HYDROCHLORIDE 10 MILLIGRAM(S): 5 TABLET ORAL at 10:24

## 2023-04-01 RX ADMIN — ENOXAPARIN SODIUM 40 MILLIGRAM(S): 100 INJECTION SUBCUTANEOUS at 11:38

## 2023-04-01 RX ADMIN — OXYCODONE HYDROCHLORIDE 10 MILLIGRAM(S): 5 TABLET ORAL at 09:54

## 2023-04-01 RX ADMIN — LOSARTAN POTASSIUM 50 MILLIGRAM(S): 100 TABLET, FILM COATED ORAL at 05:24

## 2023-04-01 RX ADMIN — CARBIDOPA AND LEVODOPA 1 TABLET(S): 25; 100 TABLET ORAL at 00:35

## 2023-04-01 RX ADMIN — Medication 5 MILLIGRAM(S): at 05:23

## 2023-04-01 RX ADMIN — Medication 5 MILLIGRAM(S): at 13:10

## 2023-04-01 RX ADMIN — ATENOLOL 100 MILLIGRAM(S): 25 TABLET ORAL at 05:23

## 2023-04-01 RX ADMIN — TAMSULOSIN HYDROCHLORIDE 0.4 MILLIGRAM(S): 0.4 CAPSULE ORAL at 21:41

## 2023-04-01 RX ADMIN — PANTOPRAZOLE SODIUM 40 MILLIGRAM(S): 20 TABLET, DELAYED RELEASE ORAL at 05:24

## 2023-04-01 RX ADMIN — ATORVASTATIN CALCIUM 20 MILLIGRAM(S): 80 TABLET, FILM COATED ORAL at 21:42

## 2023-04-01 RX ADMIN — ROPINIROLE 1 MILLIGRAM(S): 8 TABLET, FILM COATED, EXTENDED RELEASE ORAL at 21:41

## 2023-04-01 RX ADMIN — Medication 2: at 11:38

## 2023-04-01 RX ADMIN — CARBIDOPA AND LEVODOPA 1 TABLET(S): 25; 100 TABLET ORAL at 05:23

## 2023-04-01 RX ADMIN — FINASTERIDE 5 MILLIGRAM(S): 5 TABLET, FILM COATED ORAL at 11:38

## 2023-04-01 RX ADMIN — ROPINIROLE 1 MILLIGRAM(S): 8 TABLET, FILM COATED, EXTENDED RELEASE ORAL at 13:09

## 2023-04-01 RX ADMIN — ROPINIROLE 1 MILLIGRAM(S): 8 TABLET, FILM COATED, EXTENDED RELEASE ORAL at 05:22

## 2023-04-01 NOTE — OCCUPATIONAL THERAPY INITIAL EVALUATION ADULT - LEVEL OF INDEPENDENCE: BED TO CHAIR, REHAB EVAL
unsafe to perform at this time secondary to pt significant weakness. OT to rec total body life for transfers./unable to perform

## 2023-04-01 NOTE — OCCUPATIONAL THERAPY INITIAL EVALUATION ADULT - LEVEL OF INDEPENDENCE: SCOOT/BRIDGE, REHAB EVAL
REVIEW OF CARDIOVASCULAR SYSTEMS:  Cardiovascular problem(s): Shortness of Breath on Exertion: and at rest-for the past few days. Pt has had a constant chest pain that comes and goes-gets worse at times and with SOB. This has been going on since catheterization and has gotten better since then. Worse with exertion.     Patient does not smoke.    Patient does take aspirin.       maximum assist (25% patients effort)

## 2023-04-01 NOTE — OCCUPATIONAL THERAPY INITIAL EVALUATION ADULT - GENERAL OBSERVATIONS, REHAB EVAL
Pt seen 8:10-8:39. Pt received semi fine in bed eating breakfast +IV lock +carpenter. Pt left semi fine in bed eating breakfast +IV lock +carpenter. JANY monteiro.

## 2023-04-01 NOTE — OCCUPATIONAL THERAPY INITIAL EVALUATION ADULT - BATHING, PREVIOUS LEVEL OF FUNCTION, OT EVAL
+Pt reports has tub shower and shower chair +pt reports max assist from friend./needed assist/needs device

## 2023-04-02 LAB
ALBUMIN SERPL ELPH-MCNC: 3.4 G/DL — LOW (ref 3.5–5.2)
ALP SERPL-CCNC: 64 U/L — SIGNIFICANT CHANGE UP (ref 30–115)
ALT FLD-CCNC: 9 U/L — SIGNIFICANT CHANGE UP (ref 0–41)
ANION GAP SERPL CALC-SCNC: 12 MMOL/L — SIGNIFICANT CHANGE UP (ref 7–14)
AST SERPL-CCNC: 19 U/L — SIGNIFICANT CHANGE UP (ref 0–41)
BASOPHILS # BLD AUTO: 0.07 K/UL — SIGNIFICANT CHANGE UP (ref 0–0.2)
BASOPHILS NFR BLD AUTO: 1.6 % — HIGH (ref 0–1)
BILIRUB SERPL-MCNC: 0.8 MG/DL — SIGNIFICANT CHANGE UP (ref 0.2–1.2)
BUN SERPL-MCNC: 16 MG/DL — SIGNIFICANT CHANGE UP (ref 10–20)
CALCIUM SERPL-MCNC: 9.1 MG/DL — SIGNIFICANT CHANGE UP (ref 8.4–10.5)
CHLORIDE SERPL-SCNC: 97 MMOL/L — LOW (ref 98–110)
CO2 SERPL-SCNC: 30 MMOL/L — SIGNIFICANT CHANGE UP (ref 17–32)
CREAT SERPL-MCNC: 1.1 MG/DL — SIGNIFICANT CHANGE UP (ref 0.7–1.5)
EGFR: 71 ML/MIN/1.73M2 — SIGNIFICANT CHANGE UP
EOSINOPHIL # BLD AUTO: 0.5 K/UL — SIGNIFICANT CHANGE UP (ref 0–0.7)
EOSINOPHIL NFR BLD AUTO: 11.5 % — HIGH (ref 0–8)
GLUCOSE BLDC GLUCOMTR-MCNC: 116 MG/DL — HIGH (ref 70–99)
GLUCOSE BLDC GLUCOMTR-MCNC: 125 MG/DL — HIGH (ref 70–99)
GLUCOSE BLDC GLUCOMTR-MCNC: 130 MG/DL — HIGH (ref 70–99)
GLUCOSE BLDC GLUCOMTR-MCNC: 133 MG/DL — HIGH (ref 70–99)
GLUCOSE SERPL-MCNC: 134 MG/DL — HIGH (ref 70–99)
HCT VFR BLD CALC: 43.2 % — SIGNIFICANT CHANGE UP (ref 42–52)
HGB BLD-MCNC: 14.3 G/DL — SIGNIFICANT CHANGE UP (ref 14–18)
IMM GRANULOCYTES NFR BLD AUTO: 0.2 % — SIGNIFICANT CHANGE UP (ref 0.1–0.3)
LYMPHOCYTES # BLD AUTO: 1.63 K/UL — SIGNIFICANT CHANGE UP (ref 1.2–3.4)
LYMPHOCYTES # BLD AUTO: 37.4 % — SIGNIFICANT CHANGE UP (ref 20.5–51.1)
MAGNESIUM SERPL-MCNC: 2 MG/DL — SIGNIFICANT CHANGE UP (ref 1.8–2.4)
MCHC RBC-ENTMCNC: 29.4 PG — SIGNIFICANT CHANGE UP (ref 27–31)
MCHC RBC-ENTMCNC: 33.1 G/DL — SIGNIFICANT CHANGE UP (ref 32–37)
MCV RBC AUTO: 88.9 FL — SIGNIFICANT CHANGE UP (ref 80–94)
MONOCYTES # BLD AUTO: 0.44 K/UL — SIGNIFICANT CHANGE UP (ref 0.1–0.6)
MONOCYTES NFR BLD AUTO: 10.1 % — HIGH (ref 1.7–9.3)
NEUTROPHILS # BLD AUTO: 1.71 K/UL — SIGNIFICANT CHANGE UP (ref 1.4–6.5)
NEUTROPHILS NFR BLD AUTO: 39.2 % — LOW (ref 42.2–75.2)
NRBC # BLD: 0 /100 WBCS — SIGNIFICANT CHANGE UP (ref 0–0)
PLATELET # BLD AUTO: 190 K/UL — SIGNIFICANT CHANGE UP (ref 130–400)
POTASSIUM SERPL-MCNC: 3.6 MMOL/L — SIGNIFICANT CHANGE UP (ref 3.5–5)
POTASSIUM SERPL-SCNC: 3.6 MMOL/L — SIGNIFICANT CHANGE UP (ref 3.5–5)
PROT SERPL-MCNC: 5.9 G/DL — LOW (ref 6–8)
RBC # BLD: 4.86 M/UL — SIGNIFICANT CHANGE UP (ref 4.7–6.1)
RBC # FLD: 12.8 % — SIGNIFICANT CHANGE UP (ref 11.5–14.5)
SODIUM SERPL-SCNC: 139 MMOL/L — SIGNIFICANT CHANGE UP (ref 135–146)
WBC # BLD: 4.36 K/UL — LOW (ref 4.8–10.8)
WBC # FLD AUTO: 4.36 K/UL — LOW (ref 4.8–10.8)

## 2023-04-02 PROCEDURE — 99232 SBSQ HOSP IP/OBS MODERATE 35: CPT

## 2023-04-02 RX ADMIN — ROPINIROLE 1 MILLIGRAM(S): 8 TABLET, FILM COATED, EXTENDED RELEASE ORAL at 05:12

## 2023-04-02 RX ADMIN — FINASTERIDE 5 MILLIGRAM(S): 5 TABLET, FILM COATED ORAL at 11:09

## 2023-04-02 RX ADMIN — ROPINIROLE 1 MILLIGRAM(S): 8 TABLET, FILM COATED, EXTENDED RELEASE ORAL at 21:36

## 2023-04-02 RX ADMIN — ATORVASTATIN CALCIUM 20 MILLIGRAM(S): 80 TABLET, FILM COATED ORAL at 21:35

## 2023-04-02 RX ADMIN — Medication 5 MILLIGRAM(S): at 21:36

## 2023-04-02 RX ADMIN — Medication 5 MILLIGRAM(S): at 13:09

## 2023-04-02 RX ADMIN — Medication 5 MILLIGRAM(S): at 13:05

## 2023-04-02 RX ADMIN — TAMSULOSIN HYDROCHLORIDE 0.4 MILLIGRAM(S): 0.4 CAPSULE ORAL at 21:36

## 2023-04-02 RX ADMIN — Medication 5 MILLIGRAM(S): at 05:12

## 2023-04-02 RX ADMIN — CARBIDOPA AND LEVODOPA 1 TABLET(S): 25; 100 TABLET ORAL at 17:38

## 2023-04-02 RX ADMIN — Medication 5 MILLIGRAM(S): at 05:13

## 2023-04-02 RX ADMIN — PANTOPRAZOLE SODIUM 40 MILLIGRAM(S): 20 TABLET, DELAYED RELEASE ORAL at 05:13

## 2023-04-02 RX ADMIN — CARBIDOPA AND LEVODOPA 1 TABLET(S): 25; 100 TABLET ORAL at 11:09

## 2023-04-02 RX ADMIN — CARBIDOPA AND LEVODOPA 1 TABLET(S): 25; 100 TABLET ORAL at 00:57

## 2023-04-02 RX ADMIN — ROPINIROLE 1 MILLIGRAM(S): 8 TABLET, FILM COATED, EXTENDED RELEASE ORAL at 13:06

## 2023-04-02 RX ADMIN — LOSARTAN POTASSIUM 50 MILLIGRAM(S): 100 TABLET, FILM COATED ORAL at 05:13

## 2023-04-02 RX ADMIN — CARBIDOPA AND LEVODOPA 1 TABLET(S): 25; 100 TABLET ORAL at 05:12

## 2023-04-02 RX ADMIN — ENOXAPARIN SODIUM 40 MILLIGRAM(S): 100 INJECTION SUBCUTANEOUS at 11:09

## 2023-04-02 RX ADMIN — ATENOLOL 100 MILLIGRAM(S): 25 TABLET ORAL at 05:13

## 2023-04-03 LAB
ALBUMIN SERPL ELPH-MCNC: 3.5 G/DL — SIGNIFICANT CHANGE UP (ref 3.5–5.2)
ALP SERPL-CCNC: 64 U/L — SIGNIFICANT CHANGE UP (ref 30–115)
ALT FLD-CCNC: 10 U/L — SIGNIFICANT CHANGE UP (ref 0–41)
ANION GAP SERPL CALC-SCNC: 11 MMOL/L — SIGNIFICANT CHANGE UP (ref 7–14)
AST SERPL-CCNC: 21 U/L — SIGNIFICANT CHANGE UP (ref 0–41)
BASOPHILS # BLD AUTO: 0.07 K/UL — SIGNIFICANT CHANGE UP (ref 0–0.2)
BASOPHILS NFR BLD AUTO: 1.5 % — HIGH (ref 0–1)
BILIRUB SERPL-MCNC: 0.7 MG/DL — SIGNIFICANT CHANGE UP (ref 0.2–1.2)
BUN SERPL-MCNC: 18 MG/DL — SIGNIFICANT CHANGE UP (ref 10–20)
CALCIUM SERPL-MCNC: 9.2 MG/DL — SIGNIFICANT CHANGE UP (ref 8.4–10.5)
CHLORIDE SERPL-SCNC: 99 MMOL/L — SIGNIFICANT CHANGE UP (ref 98–110)
CO2 SERPL-SCNC: 29 MMOL/L — SIGNIFICANT CHANGE UP (ref 17–32)
CREAT SERPL-MCNC: 1 MG/DL — SIGNIFICANT CHANGE UP (ref 0.7–1.5)
EGFR: 80 ML/MIN/1.73M2 — SIGNIFICANT CHANGE UP
EOSINOPHIL # BLD AUTO: 0.56 K/UL — SIGNIFICANT CHANGE UP (ref 0–0.7)
EOSINOPHIL NFR BLD AUTO: 11.6 % — HIGH (ref 0–8)
GLUCOSE BLDC GLUCOMTR-MCNC: 137 MG/DL — HIGH (ref 70–99)
GLUCOSE BLDC GLUCOMTR-MCNC: 145 MG/DL — HIGH (ref 70–99)
GLUCOSE BLDC GLUCOMTR-MCNC: 152 MG/DL — HIGH (ref 70–99)
GLUCOSE SERPL-MCNC: 119 MG/DL — HIGH (ref 70–99)
HCT VFR BLD CALC: 43.2 % — SIGNIFICANT CHANGE UP (ref 42–52)
HGB BLD-MCNC: 14.3 G/DL — SIGNIFICANT CHANGE UP (ref 14–18)
IMM GRANULOCYTES NFR BLD AUTO: 0.2 % — SIGNIFICANT CHANGE UP (ref 0.1–0.3)
LYMPHOCYTES # BLD AUTO: 1.96 K/UL — SIGNIFICANT CHANGE UP (ref 1.2–3.4)
LYMPHOCYTES # BLD AUTO: 40.7 % — SIGNIFICANT CHANGE UP (ref 20.5–51.1)
MAGNESIUM SERPL-MCNC: 1.9 MG/DL — SIGNIFICANT CHANGE UP (ref 1.8–2.4)
MCHC RBC-ENTMCNC: 29.2 PG — SIGNIFICANT CHANGE UP (ref 27–31)
MCHC RBC-ENTMCNC: 33.1 G/DL — SIGNIFICANT CHANGE UP (ref 32–37)
MCV RBC AUTO: 88.3 FL — SIGNIFICANT CHANGE UP (ref 80–94)
MONOCYTES # BLD AUTO: 0.57 K/UL — SIGNIFICANT CHANGE UP (ref 0.1–0.6)
MONOCYTES NFR BLD AUTO: 11.9 % — HIGH (ref 1.7–9.3)
NEUTROPHILS # BLD AUTO: 1.64 K/UL — SIGNIFICANT CHANGE UP (ref 1.4–6.5)
NEUTROPHILS NFR BLD AUTO: 34.1 % — LOW (ref 42.2–75.2)
NRBC # BLD: 0 /100 WBCS — SIGNIFICANT CHANGE UP (ref 0–0)
PLATELET # BLD AUTO: 188 K/UL — SIGNIFICANT CHANGE UP (ref 130–400)
POTASSIUM SERPL-MCNC: 3.6 MMOL/L — SIGNIFICANT CHANGE UP (ref 3.5–5)
POTASSIUM SERPL-SCNC: 3.6 MMOL/L — SIGNIFICANT CHANGE UP (ref 3.5–5)
PROT SERPL-MCNC: 5.9 G/DL — LOW (ref 6–8)
RBC # BLD: 4.89 M/UL — SIGNIFICANT CHANGE UP (ref 4.7–6.1)
RBC # FLD: 12.8 % — SIGNIFICANT CHANGE UP (ref 11.5–14.5)
SARS-COV-2 RNA SPEC QL NAA+PROBE: SIGNIFICANT CHANGE UP
SODIUM SERPL-SCNC: 139 MMOL/L — SIGNIFICANT CHANGE UP (ref 135–146)
WBC # BLD: 4.81 K/UL — SIGNIFICANT CHANGE UP (ref 4.8–10.8)
WBC # FLD AUTO: 4.81 K/UL — SIGNIFICANT CHANGE UP (ref 4.8–10.8)

## 2023-04-03 PROCEDURE — 99232 SBSQ HOSP IP/OBS MODERATE 35: CPT

## 2023-04-03 RX ADMIN — Medication 5 MILLIGRAM(S): at 21:57

## 2023-04-03 RX ADMIN — Medication 5 MILLIGRAM(S): at 05:36

## 2023-04-03 RX ADMIN — CARBIDOPA AND LEVODOPA 1 TABLET(S): 25; 100 TABLET ORAL at 11:42

## 2023-04-03 RX ADMIN — ROPINIROLE 1 MILLIGRAM(S): 8 TABLET, FILM COATED, EXTENDED RELEASE ORAL at 05:36

## 2023-04-03 RX ADMIN — ATENOLOL 100 MILLIGRAM(S): 25 TABLET ORAL at 05:37

## 2023-04-03 RX ADMIN — ATORVASTATIN CALCIUM 20 MILLIGRAM(S): 80 TABLET, FILM COATED ORAL at 21:58

## 2023-04-03 RX ADMIN — FINASTERIDE 5 MILLIGRAM(S): 5 TABLET, FILM COATED ORAL at 11:42

## 2023-04-03 RX ADMIN — ENOXAPARIN SODIUM 40 MILLIGRAM(S): 100 INJECTION SUBCUTANEOUS at 11:42

## 2023-04-03 RX ADMIN — TAMSULOSIN HYDROCHLORIDE 0.4 MILLIGRAM(S): 0.4 CAPSULE ORAL at 21:57

## 2023-04-03 RX ADMIN — ROPINIROLE 1 MILLIGRAM(S): 8 TABLET, FILM COATED, EXTENDED RELEASE ORAL at 21:58

## 2023-04-03 RX ADMIN — CARBIDOPA AND LEVODOPA 1 TABLET(S): 25; 100 TABLET ORAL at 17:28

## 2023-04-03 RX ADMIN — Medication 5 MILLIGRAM(S): at 13:33

## 2023-04-03 RX ADMIN — Medication 5 MILLIGRAM(S): at 13:34

## 2023-04-03 RX ADMIN — Medication 5 MILLIGRAM(S): at 05:37

## 2023-04-03 RX ADMIN — Medication 2: at 11:41

## 2023-04-03 RX ADMIN — CARBIDOPA AND LEVODOPA 1 TABLET(S): 25; 100 TABLET ORAL at 23:20

## 2023-04-03 RX ADMIN — ROPINIROLE 1 MILLIGRAM(S): 8 TABLET, FILM COATED, EXTENDED RELEASE ORAL at 13:34

## 2023-04-03 RX ADMIN — CARBIDOPA AND LEVODOPA 1 TABLET(S): 25; 100 TABLET ORAL at 05:37

## 2023-04-03 RX ADMIN — PANTOPRAZOLE SODIUM 40 MILLIGRAM(S): 20 TABLET, DELAYED RELEASE ORAL at 05:37

## 2023-04-03 RX ADMIN — Medication 5 MILLIGRAM(S): at 21:58

## 2023-04-03 RX ADMIN — LOSARTAN POTASSIUM 50 MILLIGRAM(S): 100 TABLET, FILM COATED ORAL at 05:37

## 2023-04-03 NOTE — PROGRESS NOTE ADULT - ATTENDING COMMENTS
73-year-old male with history of hypertension, hyperlipidemia, DM 2  diet controlled, CVA with left-sided weakness, on aspirin, chronic right knee pain with monthly injections by Dr. Lehman of pain management, presents with weakness and difficulty to ambulate     # Generalized weakness associated with inability to ambulate due to acute right knee pain   # Severe OA of right knee- outpatient Ortho f/up for elective knee replacement sx. evaluation  -cont. pain management , PT tx.   - s/p right knee X ray - < from: Xray Knee 3 Views, Right (03.31.23 @ 10:27) > No acute fracture or dislocation. Tricompartmental osteoarthritis, severe   in the lateral compartment. Moderate knee joint effusion. Quadriceps enthesophyte. < end of copied text >               # Right shoulder pain- right shoulder X ray report < from: Xray Shoulder 2 Views, Right (03.31.23 @ 10:26) > Questionable fracture of the right sixth posterior rib, correlate with point tenderness. Otherwise, no acute fracture or dislocation.   Degenerative cysts in the greater tuberosity.   end of copied text >  - pain management tx.     # Parkinsons disease/ Hx of CVA with residual left sided weakness  - Cont home meds   - Patient not on ASA or plavix  - C/w duloxirtine    # HTN  - C/w hydrochlorthiazide and atenolol    # DM 2- diet controlled, carb. controlled diet , bsfs monitoring     # h/o dyslipidemia- c/w atorvastatin    Diet: DASH  Activity: IAT  DVT Prophylaxis: lovenox  GI Prophylaxis: pantoprazole    Code Status: Full    #Progress Note Handoff: PT tx. d/c planning , optimize pain management for OA   Disposition: STR once bed is available
Pt seen and examined in am. Case and Plan discussed at rounds and agree with resident note as reviewed above.  Pt is awaiting Insurance Authorization for OFE   anticipated for dc.

## 2023-04-04 ENCOUNTER — TRANSCRIPTION ENCOUNTER (OUTPATIENT)
Age: 73
End: 2023-04-04

## 2023-04-04 VITALS
DIASTOLIC BLOOD PRESSURE: 53 MMHG | SYSTOLIC BLOOD PRESSURE: 107 MMHG | HEART RATE: 86 BPM | TEMPERATURE: 98 F | OXYGEN SATURATION: 95 %

## 2023-04-04 LAB
ALBUMIN SERPL ELPH-MCNC: 3.4 G/DL — LOW (ref 3.5–5.2)
ALP SERPL-CCNC: 53 U/L — SIGNIFICANT CHANGE UP (ref 30–115)
ALT FLD-CCNC: 10 U/L — SIGNIFICANT CHANGE UP (ref 0–41)
ANION GAP SERPL CALC-SCNC: 7 MMOL/L — SIGNIFICANT CHANGE UP (ref 7–14)
AST SERPL-CCNC: 35 U/L — SIGNIFICANT CHANGE UP (ref 0–41)
BASOPHILS # BLD AUTO: 0.08 K/UL — SIGNIFICANT CHANGE UP (ref 0–0.2)
BASOPHILS NFR BLD AUTO: 1.6 % — HIGH (ref 0–1)
BILIRUB SERPL-MCNC: 0.8 MG/DL — SIGNIFICANT CHANGE UP (ref 0.2–1.2)
BUN SERPL-MCNC: 13 MG/DL — SIGNIFICANT CHANGE UP (ref 10–20)
CALCIUM SERPL-MCNC: 8.8 MG/DL — SIGNIFICANT CHANGE UP (ref 8.4–10.5)
CHLORIDE SERPL-SCNC: 102 MMOL/L — SIGNIFICANT CHANGE UP (ref 98–110)
CO2 SERPL-SCNC: 31 MMOL/L — SIGNIFICANT CHANGE UP (ref 17–32)
CREAT SERPL-MCNC: 1 MG/DL — SIGNIFICANT CHANGE UP (ref 0.7–1.5)
EGFR: 80 ML/MIN/1.73M2 — SIGNIFICANT CHANGE UP
EOSINOPHIL # BLD AUTO: 0.64 K/UL — SIGNIFICANT CHANGE UP (ref 0–0.7)
EOSINOPHIL NFR BLD AUTO: 12.8 % — HIGH (ref 0–8)
GLUCOSE BLDC GLUCOMTR-MCNC: 138 MG/DL — HIGH (ref 70–99)
GLUCOSE BLDC GLUCOMTR-MCNC: 186 MG/DL — HIGH (ref 70–99)
GLUCOSE SERPL-MCNC: 137 MG/DL — HIGH (ref 70–99)
HCT VFR BLD CALC: 41.8 % — LOW (ref 42–52)
HGB BLD-MCNC: 14 G/DL — SIGNIFICANT CHANGE UP (ref 14–18)
IMM GRANULOCYTES NFR BLD AUTO: 0.4 % — HIGH (ref 0.1–0.3)
LYMPHOCYTES # BLD AUTO: 1.83 K/UL — SIGNIFICANT CHANGE UP (ref 1.2–3.4)
LYMPHOCYTES # BLD AUTO: 36.6 % — SIGNIFICANT CHANGE UP (ref 20.5–51.1)
MAGNESIUM SERPL-MCNC: 1.9 MG/DL — SIGNIFICANT CHANGE UP (ref 1.8–2.4)
MCHC RBC-ENTMCNC: 29.7 PG — SIGNIFICANT CHANGE UP (ref 27–31)
MCHC RBC-ENTMCNC: 33.5 G/DL — SIGNIFICANT CHANGE UP (ref 32–37)
MCV RBC AUTO: 88.7 FL — SIGNIFICANT CHANGE UP (ref 80–94)
MONOCYTES # BLD AUTO: 0.54 K/UL — SIGNIFICANT CHANGE UP (ref 0.1–0.6)
MONOCYTES NFR BLD AUTO: 10.8 % — HIGH (ref 1.7–9.3)
NEUTROPHILS # BLD AUTO: 1.89 K/UL — SIGNIFICANT CHANGE UP (ref 1.4–6.5)
NEUTROPHILS NFR BLD AUTO: 37.8 % — LOW (ref 42.2–75.2)
NRBC # BLD: 0 /100 WBCS — SIGNIFICANT CHANGE UP (ref 0–0)
PLATELET # BLD AUTO: 181 K/UL — SIGNIFICANT CHANGE UP (ref 130–400)
POTASSIUM SERPL-MCNC: 3.7 MMOL/L — SIGNIFICANT CHANGE UP (ref 3.5–5)
POTASSIUM SERPL-SCNC: 3.7 MMOL/L — SIGNIFICANT CHANGE UP (ref 3.5–5)
PROT SERPL-MCNC: 5.7 G/DL — LOW (ref 6–8)
RBC # BLD: 4.71 M/UL — SIGNIFICANT CHANGE UP (ref 4.7–6.1)
RBC # FLD: 12.9 % — SIGNIFICANT CHANGE UP (ref 11.5–14.5)
SODIUM SERPL-SCNC: 140 MMOL/L — SIGNIFICANT CHANGE UP (ref 135–146)
WBC # BLD: 5 K/UL — SIGNIFICANT CHANGE UP (ref 4.8–10.8)
WBC # FLD AUTO: 5 K/UL — SIGNIFICANT CHANGE UP (ref 4.8–10.8)

## 2023-04-04 PROCEDURE — 99238 HOSP IP/OBS DSCHRG MGMT 30/<: CPT

## 2023-04-04 RX ORDER — LACTULOSE 10 G/15ML
20 SOLUTION ORAL DAILY
Refills: 0 | Status: DISCONTINUED | OUTPATIENT
Start: 2023-04-04 | End: 2023-04-04

## 2023-04-04 RX ORDER — OXYCODONE HYDROCHLORIDE 5 MG/1
1 TABLET ORAL
Qty: 0 | Refills: 0 | DISCHARGE
Start: 2023-04-04

## 2023-04-04 RX ORDER — LACTULOSE 10 G/15ML
30 SOLUTION ORAL
Qty: 0 | Refills: 0 | DISCHARGE
Start: 2023-04-04

## 2023-04-04 RX ADMIN — CARBIDOPA AND LEVODOPA 1 TABLET(S): 25; 100 TABLET ORAL at 11:43

## 2023-04-04 RX ADMIN — ENOXAPARIN SODIUM 40 MILLIGRAM(S): 100 INJECTION SUBCUTANEOUS at 11:43

## 2023-04-04 RX ADMIN — FINASTERIDE 5 MILLIGRAM(S): 5 TABLET, FILM COATED ORAL at 11:43

## 2023-04-04 RX ADMIN — PANTOPRAZOLE SODIUM 40 MILLIGRAM(S): 20 TABLET, DELAYED RELEASE ORAL at 05:48

## 2023-04-04 RX ADMIN — Medication 5 MILLIGRAM(S): at 05:48

## 2023-04-04 RX ADMIN — CARBIDOPA AND LEVODOPA 1 TABLET(S): 25; 100 TABLET ORAL at 05:48

## 2023-04-04 RX ADMIN — ROPINIROLE 1 MILLIGRAM(S): 8 TABLET, FILM COATED, EXTENDED RELEASE ORAL at 05:48

## 2023-04-04 RX ADMIN — Medication 2: at 11:43

## 2023-04-04 NOTE — DISCHARGE NOTE NURSING/CASE MANAGEMENT/SOCIAL WORK - PATIENT PORTAL LINK FT
You can access the FollowMyHealth Patient Portal offered by [facility] by registering at the following website: [email or web address]. By joining [employer]’s FollowMyHealth portal, you will also be able to view your health information using other applications (apps) compatible with our system.

## 2023-04-04 NOTE — PROGRESS NOTE ADULT - SUBJECTIVE AND OBJECTIVE BOX
DEMETRICE LANDRUM  [facility] (Back) 026 A ([facility] (Back))      Patient was evaluated and examined  by bedside, c/o right shoulder pain, right knee pain        REVIEW OF SYSTEMS:  please see pertinent positives mentioned above, all other 12 ROS negative      T(C): , Max: 36.8 (04-01-23 @ 12:20)  HR: 56 (04-01-23 @ 12:20)  BP: 166/60 (04-01-23 @ 12:20)  RR: 18 (04-01-23 @ 12:20)  SpO2: 94% (04-01-23 @ 12:20)  CAPILLARY BLOOD GLUCOSE      POCT Blood Glucose.: 165 mg/dL (01 Apr 2023 11:18)  POCT Blood Glucose.: 132 mg/dL (01 Apr 2023 07:44)  POCT Blood Glucose.: 145 mg/dL (31 Mar 2023 20:57)  POCT Blood Glucose.: 148 mg/dL (31 Mar 2023 16:11)      PHYSICAL EXAM:  General: NAD, AAOX3, patient is laying comfortably in bed  HEENT: AT, NC, Supple, NO JVD, NO CB  Lungs: CTA B/L, no wheezing, no rhonchi  CVS: normal S1, S2, RRR, NO M/G/R  Abdomen: soft, bowel sounds present, non-tender, non-distended  right knee: lateral aspect tenderness, no prepatellar edema   Extremities: no edema, no clubbing, no cyanosis, positive peripheral pulses b/l  Neuro: diffuse generalized body weakness, gait not tested , dysarthria , no acute neuro deficits   Skin: no rash, no ecchymosis      LAB  CBC  Date: 04-01-23 @ 08:17  Mean cell Nuksbzquxo71.2  Mean cell Hemoglobin Conc32.6  Mean cell Volum 89.7  Platelet count-Automate 185  RBC Count 4.55  Red Cell Distrib Width12.9  WBC Count5.16  % Albumin, Urine--  Hematocrit 40.8  Hemoglobin 13.3  CBC  Date: 03-31-23 @ 07:29  Mean cell Qgjltziwgh12.5  Mean cell Hemoglobin Conc32.8  Mean cell Volum 90.0  Platelet count-Automate 192  RBC Count 4.48  Red Cell Distrib Width13.0  WBC Count4.08  % Albumin, Urine--  Hematocrit 40.3  Hemoglobin 13.2  CBC  Date: 03-30-23 @ 12:40  Mean cell Opgrxnvjvo46.7  Mean cell Hemoglobin Conc33.4  Mean cell Volum 88.9  Platelet count-Automate 195  RBC Count 4.61  Red Cell Distrib Width13.2  WBC Count4.72  % Albumin, Urine--  Hematocrit 41.0  Hemoglobin 13.7    [facility]  04-01-23 @ 08:17  Blood Gas Arterial-Calcium,Ionized--  Blood Urea Nitrogen, Serum 20 mg/dL [10 - 20]  Carbon Dioxide, Serum31 mmol/L [17 - 32]  Chloride, Serum97 mmol/L<L> [98 - 110]  Creatinie, Serum1.3 mg/dL [0.7 - 1.5]  Glucose, Dhmhr594 mg/dL<H> [70 - 99]  Potassium, Serum3.6 mmol/L [3.5 - 5.0]  Sodium, Serum 140 mmol/L [135 - 146]  [facility]  03-31-23 @ 07:29  Blood Gas Arterial-Calcium,Ionized--  Blood Urea Nitrogen, Serum 16 mg/dL [10 - 20]  Carbon Dioxide, Serum29 mmol/L [17 - 32]  Chloride, Serum99 mmol/L [98 - 110]  Creatinie, Serum0.8 mg/dL [0.7 - 1.5]  Glucose, Bllnm468 mg/dL<H> [70 - 99]  Potassium, Serum3.3 mmol/L<L> [3.5 - 5.0]  Sodium, Serum 141 mmol/L [135 - 146]  [facility]  03-30-23 @ 10:30  Blood Gas Arterial-Calcium,Ionized--  Blood Urea Nitrogen, Serum 16 mg/dL [10 - 20]  Carbon Dioxide, Serum31 mmol/L [17 - 32]  Chloride, Serum99 mmol/L [98 - 110]  Creatinie, Serum0.9 mg/dL [0.7 - 1.5]  Glucose, Sidvk384 mg/dL<H> [70 - 99]  Potassium, Serum3.6 mmol/L [3.5 - 5.0] [Slighty Hemolyzed use with Caution]  Sodium, Serum 143 mmol/L [135 - 146]        Medications:  acetaminophen     Tablet .. 650 milliGRAM(s) Oral every 6 hours PRN  aluminum hydroxide/magnesium hydroxide/simethicone Suspension 30 milliLiter(s) Oral every 4 hours PRN  atenolol  Tablet 100 milliGRAM(s) Oral daily  atorvastatin 20 milliGRAM(s) Oral at bedtime  baclofen 5 milliGRAM(s) Oral every 8 hours  carbidopa/levodopa  25/250 1 Tablet(s) Oral four times a day  dextrose 5%. 1000 milliLiter(s) IV Continuous <Continuous>  dextrose 5%. 1000 milliLiter(s) IV Continuous <Continuous>  dextrose 50% Injectable 25 Gram(s) IV Push once  dextrose 50% Injectable 12.5 Gram(s) IV Push once  dextrose 50% Injectable 25 Gram(s) IV Push once  dextrose Oral Gel 15 Gram(s) Oral once PRN  enoxaparin Injectable 40 milliGRAM(s) SubCutaneous every 24 hours  finasteride 5 milliGRAM(s) Oral daily  glucagon  Injectable 1 milliGRAM(s) IntraMuscular once  insulin lispro (ADMELOG) corrective regimen sliding scale   SubCutaneous three times a day before meals  losartan 50 milliGRAM(s) Oral daily  melatonin 3 milliGRAM(s) Oral at bedtime PRN  naproxen 500 milliGRAM(s) Oral two times a day PRN  ondansetron Injectable 4 milliGRAM(s) IV Push every 8 hours PRN  oxybutynin 5 milliGRAM(s) Oral three times a day  oxyCODONE    IR 10 milliGRAM(s) Oral every 6 hours PRN  pantoprazole    Tablet 40 milliGRAM(s) Oral before breakfast  rOPINIRole 1 milliGRAM(s) Oral three times a day  tamsulosin 0.4 milliGRAM(s) Oral at bedtime        Assessment and Plan:  73-year-old male with history of hypertension, hyperlipidemia, DM 2  diet controlled, CVA with left-sided weakness, on aspirin, chronic right knee pain with monthly injections by Dr. Lehman of pain management, presents with weakness and difficulty to ambulate     # Generalized weakness associated with inability to ambulate due to acute right knee pain   # Severe OA of right knee- outpatient Ortho f/up for elective knee replacement sx. evaluation  -cont. pain management , PT tx.   - s/p right knee X ray - < from: Xray Knee 3 Views, Right (03.31.23 @ 10:27) > No acute fracture or dislocation. Tricompartmental osteoarthritis, severe   in the lateral compartment. Moderate knee joint effusion. Quadriceps enthesophyte. < end of copied text >               # Right shoulder pain- right shoulder X ray report < from: Xray Shoulder 2 Views, Right (03.31.23 @ 10:26) > Questionable fracture of the right sixth posterior rib, correlate with point tenderness. Otherwise, no acute fracture or dislocation.   Degenerative cysts in the greater tuberosity.   end of copied text >  - pain management tx.     # Parkinsons disease/ Hx of CVA with residual left sided weakness  - Cont home meds   - Patient not on ASA or plavix  - C/w duloxirtine    # HTN  - C/w hydrochlorthiazide and atenolol    # DM 2- diet controlled, carb. controlled diet , bsfs monitoring     # h/o dyslipidemia- c/w atorvastatin    Diet: DASH  Activity: IAT  DVT Prophylaxis: lovenox  GI Prophylaxis: pantoprazole    Code Status: Full    #Progress Note Handoff: PT tx. d/c planning , optimize pain management for OA   Family discussion: yes, medical team Disposition: STR once bed is available     Total time spent to complete patient's bedside assessment, review medical chart, discuss medical plan of care with covering medical team was more than 35 minutes with >50% of time spent face to face with patient, discussion with patient/family and/or coordination of care    
DEMETRICE LANDRUM 73y Male  MRN#: [id number]     Hospital Day: 5d    Pt is currently admitted with the primary diagnosis of  Difficulty in walking, not elsewhere classified        SUBJECTIVE     Overnight events  None    Subjective complaints  Pt was evaluated this am. Patient denied any active complaints and per patient his symptoms are improving                                            ----------------------------------------------------------  OBJECTIVE  PAST MEDICAL & SURGICAL HISTORY  HTN (hypertension)    Dyslipidemia                                              -----------------------------------------------------------  ALLERGIES:  [This allergen will not trigger allergy alert] Originally Entered as [Unknown] reaction to [nuts, shrimp, strawberry] (Unknown)  No Known Drug Allergies                                            ------------------------------------------------------------    HOME MEDICATIONS  Home Medications:  atenolol 100 mg oral tablet: 1 tab(s) orally once a day (30 Mar 2023 17:46)  atorvastatin 20 mg oral tablet: 1 tab(s) orally once a day (at bedtime) (30 Mar 2023 17:46)  baclofen 5 mg oral tablet: 1 tab(s) orally 3 times a day (30 Mar 2023 17:46)  carbidopa-levodopa 25 mg-250 mg oral tablet: 1 tab(s) orally 4 times a day at 8AM,12PM,4PM, and 8PM  (30 Mar 2023 17:46)  finasteride 5 mg oral tablet: 1 tab(s) orally once a day (30 Mar 2023 17:46)  losartan 50 mg oral tablet: 1 tab(s) orally once a day (30 Mar 2023 17:46)  omeprazole 20 mg oral delayed release capsule: 1 cap(s) orally once a day (30 Mar 2023 17:46)  oxybutynin 5 mg oral tablet: 1 tab(s) orally 3 times a day (30 Mar 2023 17:46)  rOPINIRole 1 mg oral tablet: 1 tab(s) orally 3 times a day (30 Mar 2023 17:46)  tamsulosin 0.4 mg oral capsule: 1 cap(s) orally once a day (at bedtime) (30 Mar 2023 17:46)  tiZANidine 2 mg oral tablet: 0.5 tab(s) orally every 8 hours (30 Mar 2023 17:46)                           MEDICATIONS:  STANDING MEDICATIONS  atenolol  Tablet 100 milliGRAM(s) Oral daily  atorvastatin 20 milliGRAM(s) Oral at bedtime  baclofen 5 milliGRAM(s) Oral every 8 hours  carbidopa/levodopa  25/250 1 Tablet(s) Oral four times a day  dextrose 5%. 1000 milliLiter(s) IV Continuous <Continuous>  dextrose 5%. 1000 milliLiter(s) IV Continuous <Continuous>  dextrose 50% Injectable 25 Gram(s) IV Push once  dextrose 50% Injectable 12.5 Gram(s) IV Push once  dextrose 50% Injectable 25 Gram(s) IV Push once  enoxaparin Injectable 40 milliGRAM(s) SubCutaneous every 24 hours  finasteride 5 milliGRAM(s) Oral daily  glucagon  Injectable 1 milliGRAM(s) IntraMuscular once  insulin lispro (ADMELOG) corrective regimen sliding scale   SubCutaneous three times a day before meals  losartan 50 milliGRAM(s) Oral daily  oxybutynin 5 milliGRAM(s) Oral three times a day  pantoprazole    Tablet 40 milliGRAM(s) Oral before breakfast  rOPINIRole 1 milliGRAM(s) Oral three times a day  tamsulosin 0.4 milliGRAM(s) Oral at bedtime    PRN MEDICATIONS  acetaminophen     Tablet .. 650 milliGRAM(s) Oral every 6 hours PRN  aluminum hydroxide/magnesium hydroxide/simethicone Suspension 30 milliLiter(s) Oral every 4 hours PRN  dextrose Oral Gel 15 Gram(s) Oral once PRN  melatonin 3 milliGRAM(s) Oral at bedtime PRN  naproxen 500 milliGRAM(s) Oral two times a day PRN  ondansetron Injectable 4 milliGRAM(s) IV Push every 8 hours PRN  oxyCODONE    IR 10 milliGRAM(s) Oral every 6 hours PRN                                            ------------------------------------------------------------  VITAL SIGNS: Last 24 Hours  T(C): 36.2 (04 Apr 2023 05:08), Max: 36.4 (03 Apr 2023 20:00)  T(F): 97.1 (04 Apr 2023 05:08), Max: 97.6 (03 Apr 2023 20:00)  HR: 85 (04 Apr 2023 05:08) (78 - 92)  BP: 91/57 (04 Apr 2023 05:08) (91/57 - 102/63)  BP(mean): --  RR: 18 (04 Apr 2023 05:08) (18 - 18)  SpO2: 97% (04 Apr 2023 05:08) (93% - 97%)                                             --------------------------------------------------------------  LABS:                        14.3   4.81  )-----------( 188      ( 03 Apr 2023 06:23 )             43.2     04-03    139  |  99  |  18  ----------------------------<  119<H>  3.6   |  29  |  1.0    Ca    9.2      03 Apr 2023 06:23  Mg     1.9     04-03    TPro  5.9<L>  /  Alb  3.5  /  TBili  0.7  /  DBili  x   /  AST  21  /  ALT  10  /  AlkPhos  64  04-03                                                              -------------------------------------------------------------  RADIOLOGY:  < from: Xray Knee 3 Views, Right (03.31.23 @ 10:27) >  FINDINGS/  IMPRESSION:    No acute fracture or dislocation. Tricompartmental osteoarthritis, severe   in the lateral compartment. Moderate knee joint effusion. Quadriceps   enthesophyte.      < from: Xray Shoulder 2 Views, Right (03.31.23 @ 10:26) >  FINDINGS/  IMPRESSION:    Questionable fracture of the right sixth posterior rib, correlate with   point tenderness. Otherwise, no acute fracture or dislocation.   Degenerative cysts in the greater tuberosity.                                          --------------------------------------------------------------    PHYSICAL EXAM:  GENERAL: NAD, lying in bed comfortably  HEAD:  Atraumatic, Normocephalic  EYES: EOMI, conjunctiva and sclera clear  ENT: Moist mucous membranes  NECK: Supple, No JVD  CHEST/LUNG: Clear to auscultation bilaterally; No rales, rhonchi, wheezing, or rubs. Unlabored respirations  HEART: regular rate and rhythm; No murmurs, rubs, or gallops  ABDOMEN: Bowel sounds present; Soft, Nontender, Nondistended.    EXTREMITIES: Warm. No clubbing, cyanosis, or edema  NERVOUS SYSTEM:  Alert & Oriented X3. No focal deficits   SKIN: No rashes or lesions                                           --------------------------------------------------------------                
  DEMETRICE LANDRUM  [facility] (Back) 026 A ([facility] (Back))      Patient was evaluated and examined  by bedside, c/o right shoulder pain, right knee pain       REVIEW OF SYSTEMS:  please see pertinent positives mentioned above, all other 12 ROS negative      T(C): , Max: 36.1 (03-31-23 @ 05:00)  HR: 74 (03-31-23 @ 05:00)  BP: 107/68 (03-31-23 @ 05:00)  RR: 18 (03-31-23 @ 05:00)  SpO2: 98% (03-31-23 @ 07:32)  CAPILLARY BLOOD GLUCOSE      POCT Blood Glucose.: 178 mg/dL (31 Mar 2023 11:09)      PHYSICAL EXAM:  General: NAD, AAOX3, patient is laying comfortably in bed  HEENT: AT, NC, Supple, NO JVD, NO CB  Shoulder: right shoulder pain with decreased RUE ROM  Lungs: CTA B/L, no wheezing, no rhonchi  CVS: normal S1, S2, RRR, NO M/G/R  Abdomen: soft, bowel sounds present, non-tender, non-distended  Right knee : pain in lateral aspect of the knee, no prepatellar edema or effusion  Extremities: no edema, no clubbing, no cyanosis, positive peripheral pulses b/l  Neuro: no acute focal neurological deficits, gait not tested   Skin: no rash, no ecchymosis      LAB  CBC  Date: 03-31-23 @ 07:29  Mean cell Mjxxatckma28.5  Mean cell Hemoglobin Conc32.8  Mean cell Volum 90.0  Platelet count-Automate 192  RBC Count 4.48  Red Cell Distrib Width13.0  WBC Count4.08  % Albumin, Urine--  Hematocrit 40.3  Hemoglobin 13.2  CBC  Date: 03-30-23 @ 12:40  Mean cell Klezycjgwk62.7  Mean cell Hemoglobin Conc33.4  Mean cell Volum 88.9  Platelet count-Automate 195  RBC Count 4.61  Red Cell Distrib Width13.2  WBC Count4.72  % Albumin, Urine--  Hematocrit 41.0  Hemoglobin 13.7    [facility]  03-31-23 @ 07:29  Blood Gas Arterial-Calcium,Ionized--  Blood Urea Nitrogen, Serum 16 mg/dL [10 - 20]  Carbon Dioxide, Serum29 mmol/L [17 - 32]  Chloride, Serum99 mmol/L [98 - 110]  Creatinie, Serum0.8 mg/dL [0.7 - 1.5]  Glucose, Jprfj126 mg/dL<H> [70 - 99]  Potassium, Serum3.3 mmol/L<L> [3.5 - 5.0]  Sodium, Serum 141 mmol/L [135 - 146]  [facility]  03-30-23 @ 10:30  Blood Gas Arterial-Calcium,Ionized--  Blood Urea Nitrogen, Serum 16 mg/dL [10 - 20]  Carbon Dioxide, Serum31 mmol/L [17 - 32]  Chloride, Serum99 mmol/L [98 - 110]  Creatinie, Serum0.9 mg/dL [0.7 - 1.5]  Glucose, Bogbt126 mg/dL<H> [70 - 99]  Potassium, Serum3.6 mmol/L [3.5 - 5.0] [Slighty Hemolyzed use with Caution]  Sodium, Serum 143 mmol/L [135 - 146]      Medications:  acetaminophen     Tablet .. 650 milliGRAM(s) Oral every 6 hours PRN  aluminum hydroxide/magnesium hydroxide/simethicone Suspension 30 milliLiter(s) Oral every 4 hours PRN  atenolol  Tablet 100 milliGRAM(s) Oral daily  atorvastatin 20 milliGRAM(s) Oral at bedtime  baclofen 5 milliGRAM(s) Oral every 8 hours  carbidopa/levodopa  25/250 1 Tablet(s) Oral four times a day  dextrose 5%. 1000 milliLiter(s) IV Continuous <Continuous>  dextrose 5%. 1000 milliLiter(s) IV Continuous <Continuous>  dextrose 50% Injectable 25 Gram(s) IV Push once  dextrose 50% Injectable 12.5 Gram(s) IV Push once  dextrose 50% Injectable 25 Gram(s) IV Push once  dextrose Oral Gel 15 Gram(s) Oral once PRN  enoxaparin Injectable 40 milliGRAM(s) SubCutaneous every 24 hours  finasteride 5 milliGRAM(s) Oral daily  glucagon  Injectable 1 milliGRAM(s) IntraMuscular once  insulin lispro (ADMELOG) corrective regimen sliding scale   SubCutaneous three times a day before meals  losartan 50 milliGRAM(s) Oral daily  melatonin 3 milliGRAM(s) Oral at bedtime PRN  naproxen 500 milliGRAM(s) Oral two times a day PRN  ondansetron Injectable 4 milliGRAM(s) IV Push every 8 hours PRN  oxybutynin 5 milliGRAM(s) Oral three times a day  pantoprazole    Tablet 40 milliGRAM(s) Oral before breakfast  rOPINIRole 1 milliGRAM(s) Oral three times a day  tamsulosin 0.4 milliGRAM(s) Oral at bedtime  traMADol 50 milliGRAM(s) Oral every 6 hours PRN        Assessment and Plan:  73-year-old male with history of hypertension, hyperlipidemia, DM 2  diet controlled, CVA with left-sided weakness, on aspirin, chronic right knee pain with monthly injections by Dr. Lehman of pain management, presents with weakness and difficulty to ambulate     # Generalized weakness associated with inability to ambulate due to acute right knee pain   # Severe OA of right knee- outpatient Ortho f/up for elective knee replacement sx. evaluation  -cont. pain management , PT tx.   - s/p right knee X ray - < from: Xray Knee 3 Views, Right (03.31.23 @ 10:27) > No acute fracture or dislocation. Tricompartmental osteoarthritis, severe   in the lateral compartment. Moderate knee joint effusion. Quadriceps enthesophyte. < end of copied text >               # Right shoulder pain- right shoulder X ray report < from: Xray Shoulder 2 Views, Right (03.31.23 @ 10:26) > Questionable fracture of the right sixth posterior rib, correlate with point tenderness. Otherwise, no acute fracture or dislocation.   Degenerative cysts in the greater tuberosity.   end of copied text >  - pain management tx.     # Parkinsons disease/ Hx of CVA with residual left sided weakness  - Cont home meds   - Patient not on ASA or plavix  - C/w duloxirtine    # HTN  - C/w hydrochlorthiazide and atenolol    # DM 2- diet controlled, carb. controlled diet , bsfs monitoring     # h/o dyslipidemia- c/w atorvastatin    Diet: DASH  Activity: IAT  DVT Prophylaxis: lovenox  GI Prophylaxis: pantoprazole    Code Status: Full    #Progress Note Handoff: PT tx. d/c planning , optimize pain management for OA   Family discussion: yes, medical team Disposition: STR     Total time spent to complete patient's bedside assessment, review medical chart, discuss medical plan of care with covering medical team was more than 35 minutes with >50% of time spent face to face with patient, discussion with patient/family and/or coordination of care        
DEMETRICE LANDRUM 73y Male  MRN#: [id number]     Hospital Day: 1d    Pt is currently admitted with the primary diagnosis of  Difficulty in walking, not elsewhere classified        SUBJECTIVE     Overnight events  None    Subjective complaints  Pt was evaluated this am. Patient complained of right knee pain and right shoulder pain.                                            ----------------------------------------------------------  OBJECTIVE  PAST MEDICAL & SURGICAL HISTORY  HTN (hypertension)    Dyslipidemia                                              -----------------------------------------------------------  ALLERGIES:  [This allergen will not trigger allergy alert] Originally Entered as [Unknown] reaction to [nuts, shrimp, strawberry] (Unknown)  No Known Drug Allergies                                            ------------------------------------------------------------    HOME MEDICATIONS  Home Medications:  atenolol 100 mg oral tablet: 1 tab(s) orally once a day (30 Mar 2023 17:46)  atorvastatin 20 mg oral tablet: 1 tab(s) orally once a day (at bedtime) (30 Mar 2023 17:46)  baclofen 5 mg oral tablet: 1 tab(s) orally 3 times a day (30 Mar 2023 17:46)  carbidopa-levodopa 25 mg-250 mg oral tablet: 1 tab(s) orally 4 times a day at 8AM,12PM,4PM, and 8PM  (30 Mar 2023 17:46)  finasteride 5 mg oral tablet: 1 tab(s) orally once a day (30 Mar 2023 17:46)  losartan 50 mg oral tablet: 1 tab(s) orally once a day (30 Mar 2023 17:46)  omeprazole 20 mg oral delayed release capsule: 1 cap(s) orally once a day (30 Mar 2023 17:46)  oxybutynin 5 mg oral tablet: 1 tab(s) orally 3 times a day (30 Mar 2023 17:46)  rOPINIRole 1 mg oral tablet: 1 tab(s) orally 3 times a day (30 Mar 2023 17:46)  tamsulosin 0.4 mg oral capsule: 1 cap(s) orally once a day (at bedtime) (30 Mar 2023 17:46)  tiZANidine 2 mg oral tablet: 0.5 tab(s) orally every 8 hours (30 Mar 2023 17:46)                           MEDICATIONS:  STANDING MEDICATIONS  atenolol  Tablet 100 milliGRAM(s) Oral daily  atorvastatin 20 milliGRAM(s) Oral at bedtime  baclofen 5 milliGRAM(s) Oral every 8 hours  carbidopa/levodopa  25/250 1 Tablet(s) Oral four times a day  dextrose 5%. 1000 milliLiter(s) IV Continuous <Continuous>  dextrose 5%. 1000 milliLiter(s) IV Continuous <Continuous>  dextrose 50% Injectable 25 Gram(s) IV Push once  dextrose 50% Injectable 12.5 Gram(s) IV Push once  dextrose 50% Injectable 25 Gram(s) IV Push once  enoxaparin Injectable 40 milliGRAM(s) SubCutaneous every 24 hours  finasteride 5 milliGRAM(s) Oral daily  glucagon  Injectable 1 milliGRAM(s) IntraMuscular once  insulin lispro (ADMELOG) corrective regimen sliding scale   SubCutaneous three times a day before meals  losartan 50 milliGRAM(s) Oral daily  oxybutynin 5 milliGRAM(s) Oral three times a day  pantoprazole    Tablet 40 milliGRAM(s) Oral before breakfast  rOPINIRole 1 milliGRAM(s) Oral three times a day  tamsulosin 0.4 milliGRAM(s) Oral at bedtime    PRN MEDICATIONS  acetaminophen     Tablet .. 650 milliGRAM(s) Oral every 6 hours PRN  aluminum hydroxide/magnesium hydroxide/simethicone Suspension 30 milliLiter(s) Oral every 4 hours PRN  dextrose Oral Gel 15 Gram(s) Oral once PRN  melatonin 3 milliGRAM(s) Oral at bedtime PRN  naproxen 500 milliGRAM(s) Oral two times a day PRN  ondansetron Injectable 4 milliGRAM(s) IV Push every 8 hours PRN  oxyCODONE    IR 10 milliGRAM(s) Oral every 6 hours PRN                                            ------------------------------------------------------------  VITAL SIGNS: Last 24 Hours  T(C): 35.9 (31 Mar 2023 12:15), Max: 36.1 (31 Mar 2023 05:00)  T(F): 96.6 (31 Mar 2023 12:15), Max: 96.9 (31 Mar 2023 05:00)  HR: 57 (31 Mar 2023 12:15) (49 - 74)  BP: 93/50 (31 Mar 2023 12:15) (93/50 - 114/60)  BP(mean): 76 (30 Mar 2023 21:21) (76 - 76)  RR: 18 (31 Mar 2023 12:15) (18 - 18)  SpO2: 94% (31 Mar 2023 12:15) (94% - 98%)                                             --------------------------------------------------------------  LABS:                        13.2   4.08  )-----------( 192      ( 31 Mar 2023 07:29 )             40.3     03-31    141  |  99  |  16  ----------------------------<  149<H>  3.3<L>   |  29  |  0.8    Ca    8.9      31 Mar 2023 07:29  Phos  3.0     03-30  Mg     2.1     03-31    TPro  5.8<L>  /  Alb  3.5  /  TBili  0.7  /  DBili  x   /  AST  19  /  ALT  9   /  AlkPhos  65  03-31          Sedimentation Rate, Erythrocyte: 33 mm/Hr *H* (03-31-23 @ 10:40)                                                      -------------------------------------------------------------  RADIOLOGY:  < from: Xray Knee 3 Views, Right (03.31.23 @ 10:27) >  FINDINGS/  IMPRESSION:    No acute fracture or dislocation. Tricompartmental osteoarthritis, severe   in the lateral compartment. Moderate knee joint effusion. Quadriceps   enthesophyte.      < from: Xray Shoulder 2 Views, Right (03.31.23 @ 10:26) >  FINDINGS/  IMPRESSION:    Questionable fracture of the right sixth posterior rib, correlate with   point tenderness. Otherwise, no acute fracture or dislocation.   Degenerative cysts in the greater tuberosity.                                                --------------------------------------------------------------    PHYSICAL EXAM:  GENERAL: NAD, lying in bed comfortably  HEAD:  Atraumatic, Normocephalic  EYES: EOMI, conjunctiva and sclera clear  ENT: Moist mucous membranes  NECK: Supple, No JVD  CHEST/LUNG: Clear to auscultation bilaterally; No rales, rhonchi, wheezing, or rubs. Unlabored respirations  HEART: regular rate and rhythm; No murmurs, rubs, or gallops  ABDOMEN: Bowel sounds present; Soft, Nontender, Nondistended.    EXTREMITIES: Tenderness present over right lateral knee Warm. No clubbing, cyanosis, or edema  NERVOUS SYSTEM:  Alert & Oriented X3. No focal deficits   SKIN: No rashes or lesions                                           --------------------------------------------------------------                
DEMETRICE LANDRUM 73y Male  MRN#: [id number]     Hospital Day: 4d    Pt is currently admitted with the primary diagnosis of  Difficulty in walking, not elsewhere classified        SUBJECTIVE     Overnight events  None    Subjective complaints  Pt was evaluated this am. Patient denied any active complaints .                                            ----------------------------------------------------------  OBJECTIVE  PAST MEDICAL & SURGICAL HISTORY  HTN (hypertension)    Dyslipidemia                                              -----------------------------------------------------------  ALLERGIES:  [This allergen will not trigger allergy alert] Originally Entered as [Unknown] reaction to [nuts, shrimp, strawberry] (Unknown)  No Known Drug Allergies                                            ------------------------------------------------------------    HOME MEDICATIONS  Home Medications:  atenolol 100 mg oral tablet: 1 tab(s) orally once a day (30 Mar 2023 17:46)  atorvastatin 20 mg oral tablet: 1 tab(s) orally once a day (at bedtime) (30 Mar 2023 17:46)  baclofen 5 mg oral tablet: 1 tab(s) orally 3 times a day (30 Mar 2023 17:46)  carbidopa-levodopa 25 mg-250 mg oral tablet: 1 tab(s) orally 4 times a day at 8AM,12PM,4PM, and 8PM  (30 Mar 2023 17:46)  finasteride 5 mg oral tablet: 1 tab(s) orally once a day (30 Mar 2023 17:46)  losartan 50 mg oral tablet: 1 tab(s) orally once a day (30 Mar 2023 17:46)  omeprazole 20 mg oral delayed release capsule: 1 cap(s) orally once a day (30 Mar 2023 17:46)  oxybutynin 5 mg oral tablet: 1 tab(s) orally 3 times a day (30 Mar 2023 17:46)  rOPINIRole 1 mg oral tablet: 1 tab(s) orally 3 times a day (30 Mar 2023 17:46)  tamsulosin 0.4 mg oral capsule: 1 cap(s) orally once a day (at bedtime) (30 Mar 2023 17:46)  tiZANidine 2 mg oral tablet: 0.5 tab(s) orally every 8 hours (30 Mar 2023 17:46)                           MEDICATIONS:  STANDING MEDICATIONS  atenolol  Tablet 100 milliGRAM(s) Oral daily  atorvastatin 20 milliGRAM(s) Oral at bedtime  baclofen 5 milliGRAM(s) Oral every 8 hours  carbidopa/levodopa  25/250 1 Tablet(s) Oral four times a day  dextrose 5%. 1000 milliLiter(s) IV Continuous <Continuous>  dextrose 5%. 1000 milliLiter(s) IV Continuous <Continuous>  dextrose 50% Injectable 25 Gram(s) IV Push once  dextrose 50% Injectable 12.5 Gram(s) IV Push once  dextrose 50% Injectable 25 Gram(s) IV Push once  enoxaparin Injectable 40 milliGRAM(s) SubCutaneous every 24 hours  finasteride 5 milliGRAM(s) Oral daily  glucagon  Injectable 1 milliGRAM(s) IntraMuscular once  insulin lispro (ADMELOG) corrective regimen sliding scale   SubCutaneous three times a day before meals  losartan 50 milliGRAM(s) Oral daily  oxybutynin 5 milliGRAM(s) Oral three times a day  pantoprazole    Tablet 40 milliGRAM(s) Oral before breakfast  rOPINIRole 1 milliGRAM(s) Oral three times a day  tamsulosin 0.4 milliGRAM(s) Oral at bedtime    PRN MEDICATIONS  acetaminophen     Tablet .. 650 milliGRAM(s) Oral every 6 hours PRN  aluminum hydroxide/magnesium hydroxide/simethicone Suspension 30 milliLiter(s) Oral every 4 hours PRN  dextrose Oral Gel 15 Gram(s) Oral once PRN  melatonin 3 milliGRAM(s) Oral at bedtime PRN  naproxen 500 milliGRAM(s) Oral two times a day PRN  ondansetron Injectable 4 milliGRAM(s) IV Push every 8 hours PRN  oxyCODONE    IR 10 milliGRAM(s) Oral every 6 hours PRN                                            ------------------------------------------------------------  VITAL SIGNS: Last 24 Hours  T(C): 36.2 (03 Apr 2023 05:48), Max: 36.8 (02 Apr 2023 13:57)  T(F): 97.1 (03 Apr 2023 05:48), Max: 98.2 (02 Apr 2023 13:57)  HR: 74 (03 Apr 2023 05:48) (74 - 82)  BP: 105/58 (03 Apr 2023 05:48) (90/60 - 113/64)  BP(mean): --  RR: 17 (03 Apr 2023 05:48) (17 - 18)  SpO2: 95% (03 Apr 2023 07:32) (95% - 99%)                                             --------------------------------------------------------------  LABS:                        14.3   4.81  )-----------( 188      ( 03 Apr 2023 06:23 )             43.2     04-02    139  |  97<L>  |  16  ----------------------------<  134<H>  3.6   |  30  |  1.1    Ca    9.1      02 Apr 2023 08:24  Mg     2.0     04-02    TPro  5.9<L>  /  Alb  3.4<L>  /  TBili  0.8  /  DBili  x   /  AST  19  /  ALT  9   /  AlkPhos  64  04-02                                                              -------------------------------------------------------------  RADIOLOGY:  < from: Xray Knee 3 Views, Right (03.31.23 @ 10:27) >  FINDINGS/  IMPRESSION:    No acute fracture or dislocation. Tricompartmental osteoarthritis, severe   in the lateral compartment. Moderate knee joint effusion. Quadriceps   enthesophyte.      < from: Xray Shoulder 2 Views, Right (03.31.23 @ 10:26) >  FINDINGS/  IMPRESSION:    Questionable fracture of the right sixth posterior rib, correlate with   point tenderness. Otherwise, no acute fracture or dislocation.   Degenerative cysts in the greater tuberosity.                                            --------------------------------------------------------------    PHYSICAL EXAM:  GENERAL: NAD, lying in bed comfortably  HEAD:  Atraumatic, Normocephalic  EYES: EOMI, conjunctiva and sclera clear  ENT: Moist mucous membranes  NECK: Supple, No JVD  CHEST/LUNG: Clear to auscultation bilaterally; No rales, rhonchi, wheezing, or rubs. Unlabored respirations  HEART: regular rate and rhythm; No murmurs, rubs, or gallops  ABDOMEN: Bowel sounds present; Soft, Nontender, Nondistended.    EXTREMITIES: Warm. No clubbing, cyanosis, or edema  NERVOUS SYSTEM:  Alert & Oriented X3. No focal deficits   SKIN: No rashes or lesions                                           --------------------------------------------------------------                
DEMETRICE LANDRUM 73y Male  MRN#: [id number]     Hospital Day: 3d    Pt is currently admitted with the primary diagnosis of  Difficulty in walking, not elsewhere classified        SUBJECTIVE     Overnight events  None    Subjective complaints  Pt was evaluated this am. Patient denied any active complaints and per patient his symptoms are improving                                            ----------------------------------------------------------  OBJECTIVE  PAST MEDICAL & SURGICAL HISTORY  HTN (hypertension)    Dyslipidemia                                              -----------------------------------------------------------  ALLERGIES:  [This allergen will not trigger allergy alert] Originally Entered as [Unknown] reaction to [nuts, shrimp, strawberry] (Unknown)  No Known Drug Allergies                                            ------------------------------------------------------------    HOME MEDICATIONS  Home Medications:  atenolol 100 mg oral tablet: 1 tab(s) orally once a day (30 Mar 2023 17:46)  atorvastatin 20 mg oral tablet: 1 tab(s) orally once a day (at bedtime) (30 Mar 2023 17:46)  baclofen 5 mg oral tablet: 1 tab(s) orally 3 times a day (30 Mar 2023 17:46)  carbidopa-levodopa 25 mg-250 mg oral tablet: 1 tab(s) orally 4 times a day at 8AM,12PM,4PM, and 8PM  (30 Mar 2023 17:46)  finasteride 5 mg oral tablet: 1 tab(s) orally once a day (30 Mar 2023 17:46)  losartan 50 mg oral tablet: 1 tab(s) orally once a day (30 Mar 2023 17:46)  omeprazole 20 mg oral delayed release capsule: 1 cap(s) orally once a day (30 Mar 2023 17:46)  oxybutynin 5 mg oral tablet: 1 tab(s) orally 3 times a day (30 Mar 2023 17:46)  rOPINIRole 1 mg oral tablet: 1 tab(s) orally 3 times a day (30 Mar 2023 17:46)  tamsulosin 0.4 mg oral capsule: 1 cap(s) orally once a day (at bedtime) (30 Mar 2023 17:46)  tiZANidine 2 mg oral tablet: 0.5 tab(s) orally every 8 hours (30 Mar 2023 17:46)                           MEDICATIONS:  STANDING MEDICATIONS  atenolol  Tablet 100 milliGRAM(s) Oral daily  atorvastatin 20 milliGRAM(s) Oral at bedtime  baclofen 5 milliGRAM(s) Oral every 8 hours  carbidopa/levodopa  25/250 1 Tablet(s) Oral four times a day  dextrose 5%. 1000 milliLiter(s) IV Continuous <Continuous>  dextrose 5%. 1000 milliLiter(s) IV Continuous <Continuous>  dextrose 50% Injectable 25 Gram(s) IV Push once  dextrose 50% Injectable 12.5 Gram(s) IV Push once  dextrose 50% Injectable 25 Gram(s) IV Push once  enoxaparin Injectable 40 milliGRAM(s) SubCutaneous every 24 hours  finasteride 5 milliGRAM(s) Oral daily  glucagon  Injectable 1 milliGRAM(s) IntraMuscular once  insulin lispro (ADMELOG) corrective regimen sliding scale   SubCutaneous three times a day before meals  losartan 50 milliGRAM(s) Oral daily  oxybutynin 5 milliGRAM(s) Oral three times a day  pantoprazole    Tablet 40 milliGRAM(s) Oral before breakfast  rOPINIRole 1 milliGRAM(s) Oral three times a day  tamsulosin 0.4 milliGRAM(s) Oral at bedtime    PRN MEDICATIONS  acetaminophen     Tablet .. 650 milliGRAM(s) Oral every 6 hours PRN  aluminum hydroxide/magnesium hydroxide/simethicone Suspension 30 milliLiter(s) Oral every 4 hours PRN  dextrose Oral Gel 15 Gram(s) Oral once PRN  melatonin 3 milliGRAM(s) Oral at bedtime PRN  naproxen 500 milliGRAM(s) Oral two times a day PRN  ondansetron Injectable 4 milliGRAM(s) IV Push every 8 hours PRN  oxyCODONE    IR 10 milliGRAM(s) Oral every 6 hours PRN                                            ------------------------------------------------------------  VITAL SIGNS: Last 24 Hours  T(C): 36.7 (02 Apr 2023 05:00), Max: 36.8 (01 Apr 2023 12:20)  T(F): 98.1 (02 Apr 2023 05:00), Max: 98.2 (01 Apr 2023 12:20)  HR: 77 (02 Apr 2023 05:00) (56 - 77)  BP: 116/69 (02 Apr 2023 05:00) (106/62 - 166/60)  BP(mean): --  RR: 18 (02 Apr 2023 05:00) (18 - 19)  SpO2: 97% (02 Apr 2023 05:00) (92% - 97%)      04-01-23 @ 07:01  -  04-02-23 @ 06:26  --------------------------------------------------------  IN: 0 mL / OUT: 550 mL / NET: -550 mL                                             --------------------------------------------------------------  LABS:                        13.3   5.16  )-----------( 185      ( 01 Apr 2023 08:17 )             40.8     04-01    140  |  97<L>  |  20  ----------------------------<  133<H>  3.6   |  31  |  1.3    Ca    9.0      01 Apr 2023 08:17  Mg     2.0     04-01    TPro  5.8<L>  /  Alb  3.5  /  TBili  0.8  /  DBili  x   /  AST  19  /  ALT  8   /  AlkPhos  62  04-01                                                              -------------------------------------------------------------  RADIOLOGY:  < from: Xray Knee 3 Views, Right (03.31.23 @ 10:27) >  FINDINGS/  IMPRESSION:    No acute fracture or dislocation. Tricompartmental osteoarthritis, severe   in the lateral compartment. Moderate knee joint effusion. Quadriceps   enthesophyte.      < from: Xray Shoulder 2 Views, Right (03.31.23 @ 10:26) >  FINDINGS/  IMPRESSION:    Questionable fracture of the right sixth posterior rib, correlate with   point tenderness. Otherwise, no acute fracture or dislocation.   Degenerative cysts in the greater tuberosity.                                            --------------------------------------------------------------    PHYSICAL EXAM:  GENERAL: NAD, lying in bed comfortably  HEAD:  Atraumatic, Normocephalic  EYES: EOMI, conjunctiva and sclera clear  ENT: Moist mucous membranes  NECK: Supple, No JVD  CHEST/LUNG: Clear to auscultation bilaterally; No rales, rhonchi, wheezing, or rubs. Unlabored respirations  HEART: regular rate and rhythm; No murmurs, rubs, or gallops  ABDOMEN: Bowel sounds present; Soft, Nontender, Nondistended.    EXTREMITIES: Tenderness present over right lateral knee Warm. No clubbing, cyanosis, or edema  NERVOUS SYSTEM:  Alert & Oriented X3. No focal deficits   SKIN: No rashes or lesions                                         --------------------------------------------------------------                
INTERVAL HPI/OVERNIGHT EVENTS:    73-year-old male with history of hypertension, hyperlipidemia, CVA with left-sided weakness, on aspirin, chronic right knee pain with monthly injections by Dr. Lehman of pain management, presents with weakness and difficulty to ambulate   c/o constipation today    REVIEW OF SYSTEMS:  CONSTITUTIONAL: No fever, weight loss, or fatigue  EYES: No eye pain, visual disturbances, or discharge  ENMT:  No difficulty hearing, tinnitus, vertigo; No sinus or throat pain  NECK: No pain or stiffness  BREASTS: No pain, masses, or nipple discharge  RESPIRATORY: No cough, wheezing, chills or hemoptysis; No shortness of breath  CARDIOVASCULAR: No chest pain, palpitations, dizziness, or leg swelling  GASTROINTESTINAL: constipation  GENITOURINARY: No dysuria, frequency, hematuria, or incontinence  NEUROLOGICAL: No headaches, memory loss, loss of strength, numbness, or tremors  SKIN: No itching, burning, rashes, or lesions   LYMPH NODES: No enlarged glands  ENDOCRINE: No heat or cold intolerance; No hair loss  MUSCULOSKELETAL: No joint pain or swelling; No muscle, back, or extremity pain  PSYCHIATRIC: No depression, anxiety, mood swings, or difficulty sleeping  HEME/LYMPH: No easy bruising, or bleeding gums  ALLERY AND IMMUNOLOGIC: No hives or eczema    VITALS:  T(F): 96.9, Max: 97.6 (04-03-23 @ 20:00)  HR: 58  BP: 115/82  RR: 18  SpO2: 98%    PHYSICAL EXAM:  GENERAL: NAD, well-groomed, well-developed  HEAD:  Atraumatic, Normocephalic  EYES: EOMI, PERRLA, conjunctiva and sclera clear  ENMT: No tonsillar erythema, exudates,   NECK: Supple, No JVD, Normal thyroid  NERVOUS SYSTEM:  Alert & Oriented X3,  CHEST/LUNG: Clear to percussion bilaterally; No rales, rhonchi, wheezing, or rubs  HEART: Regular rate and rhythm; No murmurs, rubs, or gallops  ABDOMEN: Soft, Nontender, Nondistended; Bowel sounds present  EXTREMITIES:  no edema  LYMPH: No lymphadenopathy noted  SKIN: No rashes or lesions      LABS:    04-04    140  |  102  |  13  ----------------------------<  137<H>  3.7   |  31  |  1.0    Ca    8.8      04 Apr 2023 07:48  Mg     1.9     04-04    TPro  5.7<L>  /  Alb  3.4<L>  /  TBili  0.8  /  DBili  x   /  AST  35  /  ALT  10  /  AlkPhos  53  04-04                          14.0   5.00  )-----------( 181      ( 04 Apr 2023 07:48 )             41.8           RADIOLOGY & ADDITIONAL TESTS:    Imaging Personally Reviewed:  [ ] YES  [ ] NO    MEDICATIONS:     MEDICATIONS  (STANDING):  atenolol  Tablet 100 milliGRAM(s) Oral daily  atorvastatin 20 milliGRAM(s) Oral at bedtime  baclofen 5 milliGRAM(s) Oral every 8 hours  carbidopa/levodopa  25/250 1 Tablet(s) Oral four times a day  dextrose 5%. 1000 milliLiter(s) (50 mL/Hr) IV Continuous <Continuous>  dextrose 5%. 1000 milliLiter(s) (100 mL/Hr) IV Continuous <Continuous>  dextrose 50% Injectable 25 Gram(s) IV Push once  dextrose 50% Injectable 12.5 Gram(s) IV Push once  dextrose 50% Injectable 25 Gram(s) IV Push once  enoxaparin Injectable 40 milliGRAM(s) SubCutaneous every 24 hours  finasteride 5 milliGRAM(s) Oral daily  glucagon  Injectable 1 milliGRAM(s) IntraMuscular once  insulin lispro (ADMELOG) corrective regimen sliding scale   SubCutaneous three times a day before meals  losartan 50 milliGRAM(s) Oral daily  oxybutynin 5 milliGRAM(s) Oral three times a day  pantoprazole    Tablet 40 milliGRAM(s) Oral before breakfast  rOPINIRole 1 milliGRAM(s) Oral three times a day  tamsulosin 0.4 milliGRAM(s) Oral at bedtime    MEDICATIONS  (PRN):  acetaminophen     Tablet .. 650 milliGRAM(s) Oral every 6 hours PRN Temp greater or equal to 38C (100.4F), Mild Pain (1 - 3)  aluminum hydroxide/magnesium hydroxide/simethicone Suspension 30 milliLiter(s) Oral every 4 hours PRN Dyspepsia  dextrose Oral Gel 15 Gram(s) Oral once PRN Blood Glucose LESS THAN 70 milliGRAM(s)/deciliter  lactulose Syrup 20 Gram(s) Oral daily PRN constipation  melatonin 3 milliGRAM(s) Oral at bedtime PRN Insomnia  naproxen 500 milliGRAM(s) Oral two times a day PRN Moderate Pain (4 - 6)  ondansetron Injectable 4 milliGRAM(s) IV Push every 8 hours PRN Nausea and/or Vomiting  oxyCODONE    IR 10 milliGRAM(s) Oral every 6 hours PRN Severe Pain (7 - 10)

## 2023-04-04 NOTE — PROGRESS NOTE ADULT - ASSESSMENT
73-year-old male with history of hypertension, hyperlipidemia, CVA with left-sided weakness, on aspirin, chronic right knee pain with monthly injections by Dr. Lehman of pain management, presents with weakness and difficulty to ambulate     #Generalized weakness  #Inability to ambulate  - CTH - unremarkable   - Electrolyte wnl  - continue PT/OT      #Right Knee Pain  #Right shoulder pain  #H/O Pseudogout   - during the previous admission: Patient had right knee xray showing degenerative changes, , arthrocentesis showing 38,431 cells, 90% PMN, intra and extracellular CPPD crystals, gram stain negative, uric acid 4.7. per rheum: Continue prednisone 40 mg daily until symptoms resolve  - Pt is no longer taking prednisone  - Xray Right Knee showed No evidence of acute fracture or dislocation.  -Xray right shoulder: no acute fracture or dislocation, degenerative cysts in the greater tuberosity.  -ESR33, CRP 4.8  -pain control with Naproxen and oxycodone    # Parkinsons disease  # Hx of CVA with residual left sided weakness  - C/w sinemet and roprinole  - C/w baclofen for muscle rigidity  - PT eval  - Activity: IAT    # HTN  - C/w Losartan  and atenolol    # DL  - C/w atorvastatin    Diet: DASH  Activity: IAT  DVT Prophylaxis: lovenox  GI Prophylaxis: pantoprazole  Code Status: Full  Dispo: D/C planning  
73-year-old male with history of hypertension, hyperlipidemia, CVA with left-sided weakness, on aspirin, chronic right knee pain with monthly injections by Dr. Lehman of pain management, presents with weakness and difficulty to ambulate     #Generalized weakness  #Inability to ambulate  - CTH - unremarkable   - Electrolyte wnl  - continue PT/OT    #Right Knee Pain  #Right shoulder pain  #H/O Pseudogout   - during the previous admission: Patient had right knee xray showing degenerative changes, , arthrocentesis showing 38,431 cells, 90% PMN, intra and extracellular CPPD crystals, gram stain negative, uric acid 4.7. per rheum: Continue prednisone 40 mg daily until symptoms resolve  - Pt is no longer taking prednisone  - Xray Right Knee showed No evidence of acute fracture or dislocation.  -Xray right shoulder: no acute fracture or dislocation, degenerative cysts in the greater tuberosity.  -ESR33, CRP 4.8  -pain control with Naproxen and oxycodone    # Parkinsons disease  # Hx of CVA with residual left sided weakness  - C/w sinemet and roprinole  - C/w baclofen for muscle rigidity  - Activity: IAT    # HTN  - C/w Losartan  and atenolol    # DL  - C/w atorvastatin    Diet: DASH  Activity: IAT  DVT Prophylaxis: lovenox  GI Prophylaxis: pantoprazole  Code Status: Full  Dispo: D/C planning  
73-year-old male with history of hypertension, hyperlipidemia, CVA with left-sided weakness, on aspirin, chronic right knee pain with monthly injections by Dr. Lehman of pain management, presents with weakness and difficulty to ambulate     #Generalized weakness  #Inability to ambulate  - CTH - unremarkable   - Electrolyte wnl  - continue PT/OT    #Right Knee Pain  #Right shoulder pain  #H/O Pseudogout   - during the previous admission: Patient had right knee xray showing degenerative changes, , arthrocentesis showing 38,431 cells, 90% PMN, intra and extracellular CPPD crystals, gram stain negative, uric acid 4.7. per rheum: Continue prednisone 40 mg daily until symptoms resolve  - Pt is no longer taking prednisone  - Xray Right Knee showed No evidence of acute fracture or dislocation.  -Xray right shoulder: no acute fracture or dislocation, degenerative cysts in the greater tuberosity.  -ESR33, CRP 4.8  -pain control with Naproxen and oxycodone    # Parkinsons disease  # Hx of CVA with residual left sided weakness  - C/w sinemet and roprinole  - C/w baclofen for muscle rigidity  - Activity: IAT    # HTN  - C/w Losartan  and atenolol    # DL  - C/w atorvastatin    Diet: DASH  Activity: IAT  DVT Prophylaxis: lovenox  GI Prophylaxis: pantoprazole  Code Status: Full  Dispo: D/C planning  
73-year-old male with history of hypertension, hyperlipidemia, CVA with left-sided weakness, on aspirin, chronic right knee pain with monthly injections by Dr. Lehman of pain management, presents with weakness and difficulty to ambulate     #Generalized weakness  #Inability to ambulate  - CTH - unremarkable   - Electrolyte wnl  - continue PT/OT    #Right Knee Pain  better with pain meds    #H/O Pseudogout   - during the previous admission: Patient had right knee xray showing degenerative changes, , arthrocentesis showing 38,431 cells, 90% PMN, intra and extracellular CPPD crystals, gram stain negative, uric acid 4.7. per rheum: Continue prednisone 40 mg daily until symptoms resolve  - Pt is no longer taking prednisone  - Xray Right Knee showed No evidence of acute fracture or dislocation.  -Xray right shoulder: no acute fracture or dislocation, degenerative cysts in the greater tuberosity.  -pain control with Naproxen and oxycodone    # Parkinsons disease  # Hx of CVA with residual left sided weakness  - C/w sinemet and roprinole  - C/w baclofen for muscle rigidity  - Activity: IAT    # HTN  - C/w Losartan  and atenolol    # DL  - C/w atorvastatin    # Constipation  Lactulose one dose now  Add senna for Home     seen by PT  D/C to [facility]  
73-year-old male with history of hypertension, hyperlipidemia, CVA with left-sided weakness, on aspirin, chronic right knee pain with monthly injections by Dr. Lehman of pain management, presents with weakness and difficulty to ambulate     #Generalized weakness  #Inability to ambulate  - CTH - unremarkable   - Electrolyte wnl  - continue PT/OT    #Right Knee Pain  #Right shoulder pain  #H/O Pseudogout   - during the previous admission: Patient had right knee xray showing degenerative changes, , arthrocentesis showing 38,431 cells, 90% PMN, intra and extracellular CPPD crystals, gram stain negative, uric acid 4.7. per rheum: Continue prednisone 40 mg daily until symptoms resolve  - Pt is no longer taking prednisone  - Xray Right Knee showed No evidence of acute fracture or dislocation.  -Xray right shoulder: no acute fracture or dislocation, degenerative cysts in the greater tuberosity.  -ESR33, CRP 4.8  -pain control with Naproxen and oxycodone    # Parkinsons disease  # Hx of CVA with residual left sided weakness  - C/w sinemet and roprinole  - C/w baclofen for muscle rigidity  - Activity: IAT    # HTN  - C/w Losartan  and atenolol    # DL  - C/w atorvastatin    Diet: DASH  Activity: IAT  DVT Prophylaxis: lovenox  GI Prophylaxis: pantoprazole  Code Status: Full  Dispo: D/C planning

## 2023-04-04 NOTE — DISCHARGE NOTE PROVIDER - PROVIDER TOKENS
PROVIDER:[TOKEN:[[id number]],FOLLOWUP:[2 weeks],ESTABLISHEDPATIENT:[T]],PROVIDER:[TOKEN:[[id number]],FOLLOWUP:[1 week]]

## 2023-04-04 NOTE — DISCHARGE NOTE PROVIDER - HOSPITAL COURSE
73-year-old male with history of hypertension, hyperlipidemia, CVA with left-sided weakness, on aspirin, chronic right knee pain with monthly injections by Dr. Lehman of pain management, presented with complaint of weakness and difficulty ambulating. Of note patient was recently admitted from 02/12/23 to 02/16/23 for painful joint s/p arthrocentesis. Knee fluid studies at the time showed intra and extra-cellular CPP shreya deposits suggesting pseudogout. patient was discharged to rehab on prednisone to follow up with rheumatology  He was discharged from rehab 2 weeks ago and since then he endorsed weakness and inability to ambulate for about a week. He endorsed continuous right knee pain and states that this make it difficult to perform his ADLs. Patient also stated that he had a fall (unclear about when) when he went up to use the bathroom and tried to get up but fell. He denied any head trauma or LOC at that time.  Patient lives alone and is having difficulties caring from himself. Denies numbness or tingling, fall in the past 24 hrs,  urinary changes, cough, chest pain, shortness of breath, vomiting, diarrhea, and all other symptoms.     In the ED   Vital Signs Last 24 Hrs  T(C): 35.8 (30 Mar 2023 16:01), Max: 35.8 (30 Mar 2023 16:01)  T(F): 96.5 (30 Mar 2023 16:01), Max: 96.5 (30 Mar 2023 16:01)  HR: 65 (30 Mar 2023 16:01) (65 - 65)  BP: 114/60 (30 Mar 2023 16:01) (114/60 - 114/60)  BP(mean): --  RR: 18 (30 Mar 2023 16:01) (18 - 18)  SpO2: 96% (30 Mar 2023 16:01) (96% - 96%)    Parameters below as of 30 Mar 2023 16:01  Patient On (Oxygen Delivery Method): room air    CTH showed No acute intracranial pathology. No evidence of midline shift, mass effect or intracranial hemorrhage.  Xray Right Knee showed No evidence of acute fracture or dislocation.  Labs were unremarkable.     The hospital course also included the following:  #Generalized weakness  #Inability to ambulate  - CTH - unremarkable   - Electrolyte wnl  - continue PT/OT    #Right Knee Pain  #Right shoulder pain  #H/O Pseudogout   - during the previous admission: Patient had right knee xray showing degenerative changes, , arthrocentesis showing 38,431 cells, 90% PMN, intra and extracellular CPPD crystals, gram stain negative, uric acid 4.7. per rheum: Continue prednisone 40 mg daily until symptoms resolve  - Pt is no longer taking prednisone  - Xray Right Knee showed No evidence of acute fracture or dislocation.  -Xray right shoulder: no acute fracture or dislocation, degenerative cysts in the greater tuberosity.  -ESR33, CRP 4.8  -pain control with Naproxen and oxycodone    # Parkinsons disease  # Hx of CVA with residual left sided weakness  - C/w sinemet and roprinole  - C/w baclofen for muscle rigidity  - Activity: IAT    # HTN  - C/w Losartan  and atenolol    # DL  - C/w atorvastatin    Diet: DASH  Activity: IAT  DVT Prophylaxis: lovenox  GI Prophylaxis: pantoprazole  Code Status: Full    The patientis clinically stable, tolerating diet and activity and can be discharged.

## 2023-04-04 NOTE — DISCHARGE NOTE NURSING/CASE MANAGEMENT/SOCIAL WORK - NSTRANSFERBELONGINGSRESP_GEN_A_NUR
3/2/2021    Lupe Fields  [address] [address]      Dear Lupe:    I am glad to inform you that the results of your recent PAP Smear taken in the office were reported negative, and there was no evidence of cancer.     If you have any questions or concerns, feel free to contact our office at [phone].    Sincerely,        Lina Patel, [facility]   Obstetrics and Gynecology  [address] [place] [address] [phone]   yes

## 2023-04-04 NOTE — DISCHARGE NOTE PROVIDER - NSDCCPCAREPLAN_GEN_ALL_CORE_FT
PRINCIPAL DISCHARGE DIAGNOSIS  Diagnosis: Osteoarthrosis  Assessment and Plan of Treatment: You presented with complains of right knee and right shoulder pain. Imaging confirmed that the symtoms are related to osteoarthritis. You were treated with NSAIDs and pain management. Your labs were followed. Your vitals were monitored. PT was performed. Your symptoms have resolved and can be discharged.   Please seek medical attention if you have worsening symptoms, loss of consciousness, chest pain or onset of new symptoms.      SECONDARY DISCHARGE DIAGNOSES  Diagnosis: Weakness  Assessment and Plan of Treatment: Weakness  ImageWeakness is a lack of strength. You may feel weak all over your body (generalized), or you may feel weak in one specific part of your body (focal). There are many potential causes of weakness. Sometimes, the cause of your weakness may not be known. Some causes of weakness can be serious, so it is important to see your doctor.  Follow these instructions at home:  Rest as needed.  Try to get enough sleep. Talk to your doctor about how much sleep you need each night.  Take over-the-counter and prescription medicines only as told by your doctor.  Eat a healthy, well-balanced diet. This includes:  Proteins to build muscles, such as lean meats and fish.  Fresh fruits and vegetables.  Carbohydrates to boost energy, such as whole grains.  Drink enough fluid to keep your pee (urine) clear or pale yellow.  Do strength exercises, such as arm curls and leg raises, for 30 minutes at least 2 days a week or as told by your doctor.  Think about working with a physical therapist or  to help you get stronger.  Keep all follow-up visits as told by your doctor. This is important.  Contact a doctor if:  Your weakness does not get better or it gets worse.  Your weakness affects your ability to:  Think clearly.  Do your normal daily activities.  Get help right away if:  You have sudden weakness.  You have trouble breathing or shortness of breath.  You have problems with your vision.  You have trouble talking or swallowing.  You have trouble standing or walking.  You have chest pain.  You are light-headed.  You pass out (lose consciousness).  This information is not intended to replace advice given to you by your health care provider. Make sure you discuss any questions you have with your health care provider.

## 2023-04-04 NOTE — DISCHARGE NOTE PROVIDER - CARE PROVIDER_API CALL
DIANA RICKETTS  Internal Medicine  Dirk [facility],    Phone: [phone]  Fax: ([phone]  Established Patient  Follow Up Time: 2 weeks    Chantelle Omer)  Rheumatology  [facility] Hospitalist Department, [address] [place] [address]  Phone: [phone]  Fax: [phone]  Follow Up Time: 1 week

## 2023-04-04 NOTE — DISCHARGE NOTE PROVIDER - NSDCFUSCHEDAPPT_GEN_ALL_CORE_FT
Shilo Crump  [facility]  RHEUM [address]  Scheduled Appointment: 05/22/2023

## 2023-04-04 NOTE — DISCHARGE NOTE PROVIDER - NSDCMRMEDTOKEN_GEN_ALL_CORE_FT
atenolol 100 mg oral tablet: 1 tab(s) orally once a day  atorvastatin 20 mg oral tablet: 1 tab(s) orally once a day (at bedtime)  baclofen 5 mg oral tablet: 1 tab(s) orally 3 times a day  carbidopa-levodopa 25 mg-250 mg oral tablet: 1 tab(s) orally 4 times a day at 8AM,12PM,4PM, and 8PM   finasteride 5 mg oral tablet: 1 tab(s) orally once a day  losartan 50 mg oral tablet: 1 tab(s) orally once a day  omeprazole 20 mg oral delayed release capsule: 1 cap(s) orally once a day  oxybutynin 5 mg oral tablet: 1 tab(s) orally 3 times a day  rOPINIRole 1 mg oral tablet: 1 tab(s) orally 3 times a day  tamsulosin 0.4 mg oral capsule: 1 cap(s) orally once a day (at bedtime)  tiZANidine 2 mg oral tablet: 0.5 tab(s) orally every 8 hours   aluminum hydroxide-magnesium hydroxide 200 mg-200 mg/5 mL oral suspension: 30 milliliter(s) orally every 4 hours As needed Dyspepsia  atenolol 100 mg oral tablet: 1 tab(s) orally once a day  atorvastatin 20 mg oral tablet: 1 tab(s) orally once a day (at bedtime)  baclofen 5 mg oral tablet: 1 tab(s) orally 3 times a day  carbidopa-levodopa 25 mg-250 mg oral tablet: 1 tab(s) orally 4 times a day at 8AM,12PM,4PM, and 8PM   finasteride 5 mg oral tablet: 1 tab(s) orally once a day  losartan 50 mg oral tablet: 1 tab(s) orally once a day  naproxen 500 mg oral tablet: 1 tab(s) orally 2 times a day As needed Moderate Pain (4 - 6)  omeprazole 20 mg oral delayed release capsule: 1 cap(s) orally once a day  oxybutynin 5 mg oral tablet: 1 tab(s) orally 3 times a day  oxyCODONE 10 mg oral tablet: 1 tab(s) orally every 6 hours As needed Severe Pain (7 - 10)  rOPINIRole 1 mg oral tablet: 1 tab(s) orally 3 times a day  tamsulosin 0.4 mg oral capsule: 1 cap(s) orally once a day (at bedtime)  tiZANidine 2 mg oral tablet: 0.5 tab(s) orally every 8 hours   aluminum hydroxide-magnesium hydroxide 200 mg-200 mg/5 mL oral suspension: 30 milliliter(s) orally every 4 hours As needed Dyspepsia  atenolol 100 mg oral tablet: 1 tab(s) orally once a day  atorvastatin 20 mg oral tablet: 1 tab(s) orally once a day (at bedtime)  baclofen 5 mg oral tablet: 1 tab(s) orally 3 times a day  carbidopa-levodopa 25 mg-250 mg oral tablet: 1 tab(s) orally 4 times a day at 8AM,12PM,4PM, and 8PM   finasteride 5 mg oral tablet: 1 tab(s) orally once a day  lactulose 10 g/15 mL oral syrup: 30 milliliter(s) orally once a day As needed constipation  losartan 50 mg oral tablet: 1 tab(s) orally once a day  naproxen 500 mg oral tablet: 1 tab(s) orally 2 times a day As needed Moderate Pain (4 - 6)  omeprazole 20 mg oral delayed release capsule: 1 cap(s) orally once a day  oxybutynin 5 mg oral tablet: 1 tab(s) orally 3 times a day  oxyCODONE 10 mg oral tablet: 1 tab(s) orally every 6 hours As needed Severe Pain (7 - 10)  rOPINIRole 1 mg oral tablet: 1 tab(s) orally 3 times a day  tamsulosin 0.4 mg oral capsule: 1 cap(s) orally once a day (at bedtime)  tiZANidine 2 mg oral tablet: 0.5 tab(s) orally every 8 hours

## 2023-04-07 DIAGNOSIS — M17.11 UNILATERAL PRIMARY OSTEOARTHRITIS, RIGHT KNEE: ICD-10-CM

## 2023-04-07 DIAGNOSIS — E78.5 HYPERLIPIDEMIA, UNSPECIFIED: ICD-10-CM

## 2023-04-07 DIAGNOSIS — I69.954 HEMIPLEGIA AND HEMIPARESIS FOLLOWING UNSPECIFIED CEREBROVASCULAR DISEASE AFFECTING LEFT NON-DOMINANT SIDE: ICD-10-CM

## 2023-04-07 DIAGNOSIS — I10 ESSENTIAL (PRIMARY) HYPERTENSION: ICD-10-CM

## 2023-04-07 DIAGNOSIS — Z79.82 LONG TERM (CURRENT) USE OF ASPIRIN: ICD-10-CM

## 2023-04-07 DIAGNOSIS — K59.00 CONSTIPATION, UNSPECIFIED: ICD-10-CM

## 2023-04-07 DIAGNOSIS — E11.9 TYPE 2 DIABETES MELLITUS WITHOUT COMPLICATIONS: ICD-10-CM

## 2023-04-07 DIAGNOSIS — G20 PARKINSON'S DISEASE: ICD-10-CM

## 2023-04-07 DIAGNOSIS — M19.011 PRIMARY OSTEOARTHRITIS, RIGHT SHOULDER: ICD-10-CM

## 2023-04-07 NOTE — PHYSICAL EXAM
[General Appearance - Alert] : alert [General Appearance - In No Acute Distress] : in no acute distress [Sclera] : the sclera and conjunctiva were normal [PERRL With Normal Accommodation] : pupils were equal in size, round, and reactive to light [Extraocular Movements] : extraocular movements were intact [Outer Ear] : the ears and nose were normal in appearance [Oropharynx] : the oropharynx was normal father/mother [Neck Appearance] : the appearance of the neck was normal [Neck Cervical Mass (___cm)] : no neck mass was observed [Jugular Venous Distention Increased] : there was no jugular-venous distention [Thyroid Diffuse Enlargement] : the thyroid was not enlarged [Thyroid Nodule] : there were no palpable thyroid nodules [Auscultation Breath Sounds / Voice Sounds] : lungs were clear to auscultation bilaterally [Heart Rate And Rhythm] : heart rate was normal and rhythm regular [Heart Sounds] : normal S1 and S2 [Heart Sounds Gallop] : no gallops [Murmurs] : no murmurs [Heart Sounds Pericardial Friction Rub] : no pericardial rub [Bowel Sounds] : normal bowel sounds [Abdomen Soft] : soft [Abdomen Tenderness] : non-tender [Abdomen Mass (___ Cm)] : no abdominal mass palpated [Abnormal Walk] : normal gait [Nail Clubbing] : no clubbing  or cyanosis of the fingernails [Motor Tone] : muscle strength and tone were normal [] : no rash [Affect] : the affect was normal [Mood] : the mood was normal [FreeTextEntry1] : Right knee effusion, improved

## 2023-04-28 NOTE — ED PROVIDER NOTE - CLINICAL SUMMARY MEDICAL DECISION MAKING FREE TEXT BOX
72yo man h/o CVA, Parkinson’s dz with recent admission and stay in short term rehab has been declining since he was d/c. He reports that he did well in the rehab but lives alone and has no support, only neighbors who check in on him. Today he was completely unable to ambulate so came to the ED. No new injury, no falls, no HA, nausea, vomiting, fever, chills. On exam VS as noted, pt with flat facies 2/2 parkinson’s but answers questions appropriately. Moves all extremities and otherwise nonfocal. ED workup unremarkable. As it has been more than 30 days since discharge, will admit for PT and possible SNF placement.

## 2023-05-22 ENCOUNTER — APPOINTMENT (OUTPATIENT)
Dept: RHEUMATOLOGY | Facility: CLINIC | Age: 73
End: 2023-05-22
Payer: MEDICARE

## 2023-05-22 ENCOUNTER — APPOINTMENT (OUTPATIENT)
Dept: RHEUMATOLOGY | Facility: CLINIC | Age: 73
End: 2023-05-22

## 2023-05-22 VITALS
OXYGEN SATURATION: 97 % | DIASTOLIC BLOOD PRESSURE: 86 MMHG | HEART RATE: 62 BPM | HEIGHT: 60 IN | WEIGHT: 185 LBS | SYSTOLIC BLOOD PRESSURE: 124 MMHG | BODY MASS INDEX: 36.32 KG/M2

## 2023-05-22 DIAGNOSIS — M11.20 OTHER CHONDROCALCINOSIS, UNSPECIFIED SITE: ICD-10-CM

## 2023-05-22 PROCEDURE — 99214 OFFICE O/P EST MOD 30 MIN: CPT

## 2023-05-22 RX ORDER — PREDNISONE 10 MG/1
10 TABLET ORAL
Qty: 60 | Refills: 0 | Status: ACTIVE | COMMUNITY
Start: 2023-05-22 | End: 1900-01-01

## 2023-05-22 NOTE — HISTORY OF PRESENT ILLNESS
[FreeTextEntry1] : 5/22/23:\par He reports participating in physical therapy he takes percocet before PT and he can walk 5 feet until he gets knee pain. He is otherwise wheelchair bound most of the day. Otherwise he reports pain all the time in his R knee. Denies swelling. He sees Dr. Lehman and received steroid and lidocaine injection in the R knee in April. He was also admitted to [facility] for weakness in April. Prednisone helped his R knee pain but symptoms are temporary.  R knee x-ray in March showed severe lateral compartment degenerative changes\par \par \par \par Initial visit:\par 73-year-old male with history of hypertension, hyperlipidemia, CVA with left-sided weakness, on aspirin, chronic right knee pain with monthly injections by Dr. Lehman of pain management, admitted with worsening right knee pain 2/12-2/16. His right knee x-ray showed osteoarthritis and arthrocentesis showed 38k WBCs 90% PMN, 7k RBCs with intra and extra cellular CPPD crystals. He was on antibiotics but were stopped. He was started on prednisone 40 mg daily 2/15. Today he says his pain and swelling is better but has pain when he bears weight and walks, which has been limited. He is able to bend his knee. He started PT yesterday and it was postponed today.

## 2023-05-22 NOTE — PHYSICAL EXAM
[General Appearance - Alert] : alert [General Appearance - In No Acute Distress] : in no acute distress [Sclera] : the sclera and conjunctiva were normal [PERRL With Normal Accommodation] : pupils were equal in size, round, and reactive to light [Extraocular Movements] : extraocular movements were intact [Outer Ear] : the ears and nose were normal in appearance [Oropharynx] : the oropharynx was normal [Neck Appearance] : the appearance of the neck was normal [Neck Cervical Mass (___cm)] : no neck mass was observed [Jugular Venous Distention Increased] : there was no jugular-venous distention [Thyroid Diffuse Enlargement] : the thyroid was not enlarged [Thyroid Nodule] : there were no palpable thyroid nodules [Auscultation Breath Sounds / Voice Sounds] : lungs were clear to auscultation bilaterally [Heart Rate And Rhythm] : heart rate was normal and rhythm regular [Heart Sounds] : normal S1 and S2 [Heart Sounds Gallop] : no gallops [Murmurs] : no murmurs [Heart Sounds Pericardial Friction Rub] : no pericardial rub [Bowel Sounds] : normal bowel sounds [Abdomen Soft] : soft [Abdomen Tenderness] : non-tender [Abdomen Mass (___ Cm)] : no abdominal mass palpated [Abnormal Walk] : normal gait [Nail Clubbing] : no clubbing  or cyanosis of the fingernails [Motor Tone] : muscle strength and tone were normal [FreeTextEntry1] : Right knee crepitus, mild effusion, improved [] : no rash [Affect] : the affect was normal [Mood] : the mood was normal

## 2023-05-22 NOTE — ASSESSMENT
[FreeTextEntry1] : Right knee pseudogout\par Right knee osteoarthritis\par -I  suspect his symptoms of R knee pain are mainly from osteoarthritis. There is no significant swelling \par -CPPD crystals seen on arthrocentesis while hospitalized in February\par -Start prednisone 40 mg x 5 days, 30 mg x 5 days, 20 mg x 5 days, 10 mg x 5 days\par -If no improvement with prednisone can consider colchicine or NSAIDs\par -Ice\par -Continue physical therapy \par -Referral to orthopedic surgery for R knee osteoarthritis for possible knee replacement\par -Follow up 2 months

## 2023-07-12 ENCOUNTER — APPOINTMENT (OUTPATIENT)
Dept: RHEUMATOLOGY | Facility: CLINIC | Age: 73
End: 2023-07-12

## 2023-07-21 ENCOUNTER — APPOINTMENT (OUTPATIENT)
Dept: ORTHOPEDIC SURGERY | Facility: CLINIC | Age: 73
End: 2023-07-21
Payer: MEDICARE

## 2023-07-21 ENCOUNTER — APPOINTMENT (OUTPATIENT)
Dept: NEUROLOGY | Facility: CLINIC | Age: 73
End: 2023-07-21

## 2023-07-21 PROCEDURE — 20610 DRAIN/INJ JOINT/BURSA W/O US: CPT | Mod: RT

## 2023-07-21 PROCEDURE — 99203 OFFICE O/P NEW LOW 30 MIN: CPT | Mod: 25

## 2023-07-21 PROCEDURE — 73560 X-RAY EXAM OF KNEE 1 OR 2: CPT | Mod: 50

## 2023-07-21 NOTE — HISTORY OF PRESENT ILLNESS
[de-identified] : Right knee pain\par 73M W/ HX OF PARKINSON, CVS, LEFT SIDED WEAKNESS\par SEVERE OA OF RIGHT KNEE\par POSSIBLE GOUT\par WE DISCUSSED BUT NOT A GOOD CANDIDATE FOR A TKA AT THIS POINT\par INJECTION DONE TODAY\par \par Reports right knee pain not well controlled with medication and therapeutic modalities alone.\par Notes difficulty with ADL's secondary to knee pain.\par Pain related to activity but only partially improved with rest, medications, modalities.\par \par Past Medical History is unchanged, all medical issues and medications are stable.\par \par Review of systems is negative for fevers, chills, chest pain, shortness of breath, unexplained weight fluctuations, GI or  symptoms.\par \par Smoking history and social habits are unchanged.\par \par Right Knee:\par JLT noted, guarding with ROM, decreased quad extension strength, no contracture, no gross instability.\par \par \par Imaging:\par Prior imaging on chart reviewed\par \par Impression is right knee pain likely do to degenerative changes and internal derangement.\par \par Plan for right knee injection\par \par Prior to injection, risks and benefits of the procedure were discussed, including but not limited to pain, swelling, failure to improve symptoms and in rare cases infection or allergic reaction. \par The right knee was prepped and draped with betadine and alcohol.  Using sterile technique 1cc of 40mg/cc kenalog solution mixed with 4cc of 1% lidocaine was injected thru an anterolateral portal using a 22guage needle.  Upon withdrawing the needle pressure was applied with to the injection site for approximately 1 minute.  Once hemostasis was achieved a band-aid dressing was applied.  The patient tolerated the procedure well.\par \par followup in 4-6 months or as needed.\par \par

## 2023-07-24 ENCOUNTER — APPOINTMENT (OUTPATIENT)
Dept: RHEUMATOLOGY | Facility: CLINIC | Age: 73
End: 2023-07-24

## 2023-08-23 ENCOUNTER — INPATIENT (INPATIENT)
Facility: HOSPITAL | Age: 73
LOS: 0 days | Discharge: HOME | DRG: 884 | End: 2023-08-24
Attending: INTERNAL MEDICINE | Admitting: HOSPITALIST
Payer: MEDICARE

## 2023-08-23 VITALS
TEMPERATURE: 98 F | OXYGEN SATURATION: 97 % | RESPIRATION RATE: 18 BRPM | HEIGHT: 69 IN | WEIGHT: 162.04 LBS | HEART RATE: 54 BPM | SYSTOLIC BLOOD PRESSURE: 144 MMHG | DIASTOLIC BLOOD PRESSURE: 77 MMHG

## 2023-08-23 DIAGNOSIS — R40.4 TRANSIENT ALTERATION OF AWARENESS: ICD-10-CM

## 2023-08-23 LAB
ALBUMIN SERPL ELPH-MCNC: 4.1 G/DL — SIGNIFICANT CHANGE UP (ref 3.5–5.2)
ALP SERPL-CCNC: 67 U/L — SIGNIFICANT CHANGE UP (ref 30–115)
ALT FLD-CCNC: 16 U/L — SIGNIFICANT CHANGE UP (ref 0–41)
ANION GAP SERPL CALC-SCNC: 10 MMOL/L — SIGNIFICANT CHANGE UP (ref 7–14)
APPEARANCE UR: CLEAR — SIGNIFICANT CHANGE UP
AST SERPL-CCNC: 20 U/L — SIGNIFICANT CHANGE UP (ref 0–41)
BASOPHILS # BLD AUTO: 0.03 K/UL — SIGNIFICANT CHANGE UP (ref 0–0.2)
BASOPHILS NFR BLD AUTO: 0.7 % — SIGNIFICANT CHANGE UP (ref 0–1)
BILIRUB SERPL-MCNC: 1.1 MG/DL — SIGNIFICANT CHANGE UP (ref 0.2–1.2)
BILIRUB UR-MCNC: NEGATIVE — SIGNIFICANT CHANGE UP
BUN SERPL-MCNC: 13 MG/DL — SIGNIFICANT CHANGE UP (ref 10–20)
CALCIUM SERPL-MCNC: 9.5 MG/DL — SIGNIFICANT CHANGE UP (ref 8.4–10.5)
CHLORIDE SERPL-SCNC: 104 MMOL/L — SIGNIFICANT CHANGE UP (ref 98–110)
CO2 SERPL-SCNC: 27 MMOL/L — SIGNIFICANT CHANGE UP (ref 17–32)
COLOR SPEC: YELLOW — SIGNIFICANT CHANGE UP
CREAT SERPL-MCNC: 0.8 MG/DL — SIGNIFICANT CHANGE UP (ref 0.7–1.5)
DIFF PNL FLD: NEGATIVE — SIGNIFICANT CHANGE UP
EGFR: 94 ML/MIN/1.73M2 — SIGNIFICANT CHANGE UP
EOSINOPHIL # BLD AUTO: 0.14 K/UL — SIGNIFICANT CHANGE UP (ref 0–0.7)
EOSINOPHIL NFR BLD AUTO: 3.1 % — SIGNIFICANT CHANGE UP (ref 0–8)
GLUCOSE SERPL-MCNC: 100 MG/DL — HIGH (ref 70–99)
GLUCOSE UR QL: NEGATIVE MG/DL — SIGNIFICANT CHANGE UP
HCT VFR BLD CALC: 43.8 % — SIGNIFICANT CHANGE UP (ref 42–52)
HGB BLD-MCNC: 14.8 G/DL — SIGNIFICANT CHANGE UP (ref 14–18)
IMM GRANULOCYTES NFR BLD AUTO: 0.2 % — SIGNIFICANT CHANGE UP (ref 0.1–0.3)
KETONES UR-MCNC: NEGATIVE MG/DL — SIGNIFICANT CHANGE UP
LEUKOCYTE ESTERASE UR-ACNC: NEGATIVE — SIGNIFICANT CHANGE UP
LYMPHOCYTES # BLD AUTO: 1.51 K/UL — SIGNIFICANT CHANGE UP (ref 1.2–3.4)
LYMPHOCYTES # BLD AUTO: 33.3 % — SIGNIFICANT CHANGE UP (ref 20.5–51.1)
MAGNESIUM SERPL-MCNC: 1.8 MG/DL — SIGNIFICANT CHANGE UP (ref 1.8–2.4)
MCHC RBC-ENTMCNC: 30.7 PG — SIGNIFICANT CHANGE UP (ref 27–31)
MCHC RBC-ENTMCNC: 33.8 G/DL — SIGNIFICANT CHANGE UP (ref 32–37)
MCV RBC AUTO: 90.9 FL — SIGNIFICANT CHANGE UP (ref 80–94)
MONOCYTES # BLD AUTO: 0.4 K/UL — SIGNIFICANT CHANGE UP (ref 0.1–0.6)
MONOCYTES NFR BLD AUTO: 8.8 % — SIGNIFICANT CHANGE UP (ref 1.7–9.3)
NEUTROPHILS # BLD AUTO: 2.45 K/UL — SIGNIFICANT CHANGE UP (ref 1.4–6.5)
NEUTROPHILS NFR BLD AUTO: 53.9 % — SIGNIFICANT CHANGE UP (ref 42.2–75.2)
NITRITE UR-MCNC: NEGATIVE — SIGNIFICANT CHANGE UP
NRBC # BLD: 0 /100 WBCS — SIGNIFICANT CHANGE UP (ref 0–0)
NT-PROBNP SERPL-SCNC: 110 PG/ML — SIGNIFICANT CHANGE UP (ref 0–300)
PH UR: 6.5 — SIGNIFICANT CHANGE UP (ref 5–8)
PLATELET # BLD AUTO: 177 K/UL — SIGNIFICANT CHANGE UP (ref 130–400)
PMV BLD: 11 FL — HIGH (ref 7.4–10.4)
POTASSIUM SERPL-MCNC: 4.7 MMOL/L — SIGNIFICANT CHANGE UP (ref 3.5–5)
POTASSIUM SERPL-SCNC: 4.7 MMOL/L — SIGNIFICANT CHANGE UP (ref 3.5–5)
PROT SERPL-MCNC: 6.2 G/DL — SIGNIFICANT CHANGE UP (ref 6–8)
PROT UR-MCNC: NEGATIVE MG/DL — SIGNIFICANT CHANGE UP
RBC # BLD: 4.82 M/UL — SIGNIFICANT CHANGE UP (ref 4.7–6.1)
RBC # FLD: 12.4 % — SIGNIFICANT CHANGE UP (ref 11.5–14.5)
SODIUM SERPL-SCNC: 141 MMOL/L — SIGNIFICANT CHANGE UP (ref 135–146)
SP GR SPEC: >1.03 — HIGH (ref 1–1.03)
TROPONIN T SERPL-MCNC: <0.01 NG/ML — SIGNIFICANT CHANGE UP
UROBILINOGEN FLD QL: 1 MG/DL — SIGNIFICANT CHANGE UP (ref 0.2–1)
WBC # BLD: 4.54 K/UL — LOW (ref 4.8–10.8)
WBC # FLD AUTO: 4.54 K/UL — LOW (ref 4.8–10.8)

## 2023-08-23 PROCEDURE — 95816 EEG AWAKE AND DROWSY: CPT

## 2023-08-23 PROCEDURE — 81003 URINALYSIS AUTO W/O SCOPE: CPT

## 2023-08-23 PROCEDURE — 82746 ASSAY OF FOLIC ACID SERUM: CPT

## 2023-08-23 PROCEDURE — 99285 EMERGENCY DEPT VISIT HI MDM: CPT | Mod: FS

## 2023-08-23 PROCEDURE — 97162 PT EVAL MOD COMPLEX 30 MIN: CPT | Mod: GP

## 2023-08-23 PROCEDURE — 70450 CT HEAD/BRAIN W/O DYE: CPT | Mod: 26,MA,XU

## 2023-08-23 PROCEDURE — 82607 VITAMIN B-12: CPT

## 2023-08-23 PROCEDURE — 70498 CT ANGIOGRAPHY NECK: CPT | Mod: 26,MA

## 2023-08-23 PROCEDURE — 71045 X-RAY EXAM CHEST 1 VIEW: CPT | Mod: 26

## 2023-08-23 PROCEDURE — 83735 ASSAY OF MAGNESIUM: CPT

## 2023-08-23 PROCEDURE — 84443 ASSAY THYROID STIM HORMONE: CPT

## 2023-08-23 PROCEDURE — 99223 1ST HOSP IP/OBS HIGH 75: CPT

## 2023-08-23 PROCEDURE — 80053 COMPREHEN METABOLIC PANEL: CPT

## 2023-08-23 PROCEDURE — 87086 URINE CULTURE/COLONY COUNT: CPT

## 2023-08-23 PROCEDURE — 70496 CT ANGIOGRAPHY HEAD: CPT | Mod: 26,MA

## 2023-08-23 PROCEDURE — 36415 COLL VENOUS BLD VENIPUNCTURE: CPT

## 2023-08-23 PROCEDURE — 85025 COMPLETE CBC W/AUTO DIFF WBC: CPT

## 2023-08-23 RX ORDER — ACETAMINOPHEN 500 MG
650 TABLET ORAL EVERY 6 HOURS
Refills: 0 | Status: DISCONTINUED | OUTPATIENT
Start: 2023-08-23 | End: 2023-08-24

## 2023-08-23 RX ORDER — POLYETHYLENE GLYCOL 3350 17 G/17G
17 POWDER, FOR SOLUTION ORAL
Refills: 0 | DISCHARGE

## 2023-08-23 RX ORDER — LOSARTAN POTASSIUM 100 MG/1
50 TABLET, FILM COATED ORAL DAILY
Refills: 0 | Status: DISCONTINUED | OUTPATIENT
Start: 2023-08-23 | End: 2023-08-24

## 2023-08-23 RX ORDER — CARBIDOPA AND LEVODOPA 25; 100 MG/1; MG/1
1 TABLET ORAL
Refills: 0 | Status: DISCONTINUED | OUTPATIENT
Start: 2023-08-23 | End: 2023-08-24

## 2023-08-23 RX ORDER — CARBIDOPA AND LEVODOPA 25; 100 MG/1; MG/1
1 TABLET ORAL ONCE
Refills: 0 | Status: COMPLETED | OUTPATIENT
Start: 2023-08-23 | End: 2023-08-23

## 2023-08-23 RX ORDER — BACLOFEN 100 %
1 POWDER (GRAM) MISCELLANEOUS
Refills: 0 | DISCHARGE

## 2023-08-23 RX ORDER — OMEPRAZOLE 10 MG/1
1 CAPSULE, DELAYED RELEASE ORAL
Qty: 0 | Refills: 0 | DISCHARGE

## 2023-08-23 RX ORDER — MAGNESIUM SULFATE 500 MG/ML
2 VIAL (ML) INJECTION ONCE
Refills: 0 | Status: COMPLETED | OUTPATIENT
Start: 2023-08-23 | End: 2023-08-23

## 2023-08-23 RX ORDER — LANOLIN ALCOHOL/MO/W.PET/CERES
5 CREAM (GRAM) TOPICAL AT BEDTIME
Refills: 0 | Status: DISCONTINUED | OUTPATIENT
Start: 2023-08-23 | End: 2023-08-24

## 2023-08-23 RX ORDER — ENOXAPARIN SODIUM 100 MG/ML
40 INJECTION SUBCUTANEOUS EVERY 24 HOURS
Refills: 0 | Status: DISCONTINUED | OUTPATIENT
Start: 2023-08-23 | End: 2023-08-24

## 2023-08-23 RX ORDER — OXYCODONE HYDROCHLORIDE 5 MG/1
1 TABLET ORAL
Refills: 0 | DISCHARGE

## 2023-08-23 RX ORDER — ASPIRIN/CALCIUM CARB/MAGNESIUM 324 MG
81 TABLET ORAL DAILY
Refills: 0 | Status: DISCONTINUED | OUTPATIENT
Start: 2023-08-23 | End: 2023-08-24

## 2023-08-23 RX ORDER — DULOXETINE HYDROCHLORIDE 30 MG/1
30 CAPSULE, DELAYED RELEASE ORAL EVERY 12 HOURS
Refills: 0 | Status: DISCONTINUED | OUTPATIENT
Start: 2023-08-23 | End: 2023-08-24

## 2023-08-23 RX ORDER — BACLOFEN 100 %
10 POWDER (GRAM) MISCELLANEOUS EVERY 8 HOURS
Refills: 0 | Status: DISCONTINUED | OUTPATIENT
Start: 2023-08-23 | End: 2023-08-24

## 2023-08-23 RX ORDER — FINASTERIDE 5 MG/1
5 TABLET, FILM COATED ORAL DAILY
Refills: 0 | Status: DISCONTINUED | OUTPATIENT
Start: 2023-08-23 | End: 2023-08-24

## 2023-08-23 RX ORDER — OXYBUTYNIN CHLORIDE 5 MG
5 TABLET ORAL THREE TIMES A DAY
Refills: 0 | Status: DISCONTINUED | OUTPATIENT
Start: 2023-08-23 | End: 2023-08-24

## 2023-08-23 RX ORDER — TAMSULOSIN HYDROCHLORIDE 0.4 MG/1
0.4 CAPSULE ORAL AT BEDTIME
Refills: 0 | Status: DISCONTINUED | OUTPATIENT
Start: 2023-08-23 | End: 2023-08-24

## 2023-08-23 RX ORDER — ROPINIROLE 8 MG/1
1 TABLET, FILM COATED, EXTENDED RELEASE ORAL EVERY 8 HOURS
Refills: 0 | Status: DISCONTINUED | OUTPATIENT
Start: 2023-08-23 | End: 2023-08-24

## 2023-08-23 RX ORDER — SODIUM CHLORIDE 9 MG/ML
1000 INJECTION INTRAMUSCULAR; INTRAVENOUS; SUBCUTANEOUS ONCE
Refills: 0 | Status: COMPLETED | OUTPATIENT
Start: 2023-08-23 | End: 2023-08-23

## 2023-08-23 RX ORDER — POLYETHYLENE GLYCOL 3350 17 G/17G
17 POWDER, FOR SOLUTION ORAL DAILY
Refills: 0 | Status: DISCONTINUED | OUTPATIENT
Start: 2023-08-23 | End: 2023-08-24

## 2023-08-23 RX ORDER — ATORVASTATIN CALCIUM 80 MG/1
20 TABLET, FILM COATED ORAL AT BEDTIME
Refills: 0 | Status: DISCONTINUED | OUTPATIENT
Start: 2023-08-23 | End: 2023-08-24

## 2023-08-23 RX ORDER — ASPIRIN/CALCIUM CARB/MAGNESIUM 324 MG
1 TABLET ORAL
Refills: 0 | DISCHARGE

## 2023-08-23 RX ORDER — PANTOPRAZOLE SODIUM 20 MG/1
40 TABLET, DELAYED RELEASE ORAL
Refills: 0 | Status: DISCONTINUED | OUTPATIENT
Start: 2023-08-23 | End: 2023-08-24

## 2023-08-23 RX ORDER — DULOXETINE HYDROCHLORIDE 30 MG/1
1 CAPSULE, DELAYED RELEASE ORAL
Refills: 0 | DISCHARGE

## 2023-08-23 RX ORDER — TIZANIDINE 4 MG/1
0.5 TABLET ORAL
Qty: 0 | Refills: 0 | DISCHARGE

## 2023-08-23 RX ADMIN — Medication 25 GRAM(S): at 21:45

## 2023-08-23 RX ADMIN — Medication 5 MILLIGRAM(S): at 21:46

## 2023-08-23 RX ADMIN — CARBIDOPA AND LEVODOPA 1 TABLET(S): 25; 100 TABLET ORAL at 15:57

## 2023-08-23 RX ADMIN — TAMSULOSIN HYDROCHLORIDE 0.4 MILLIGRAM(S): 0.4 CAPSULE ORAL at 21:46

## 2023-08-23 RX ADMIN — ATORVASTATIN CALCIUM 20 MILLIGRAM(S): 80 TABLET, FILM COATED ORAL at 21:46

## 2023-08-23 RX ADMIN — SODIUM CHLORIDE 1000 MILLILITER(S): 9 INJECTION INTRAMUSCULAR; INTRAVENOUS; SUBCUTANEOUS at 10:08

## 2023-08-23 RX ADMIN — CARBIDOPA AND LEVODOPA 1 TABLET(S): 25; 100 TABLET ORAL at 21:45

## 2023-08-23 RX ADMIN — Medication 10 MILLIGRAM(S): at 21:46

## 2023-08-23 RX ADMIN — ROPINIROLE 1 MILLIGRAM(S): 8 TABLET, FILM COATED, EXTENDED RELEASE ORAL at 21:47

## 2023-08-23 NOTE — ED PROVIDER NOTE - CLINICAL SUMMARY MEDICAL DECISION MAKING FREE TEXT BOX
73-year-old male that presents due to concern for unresponsiveness Labs EKG imaging. neuro evaluated pt. Labs and EKG were ordered and reviewed.  Imaging was ordered and reviewed by me.  Appropriate medications for patient's presenting complaints were ordered and effects were reassessed.  Patient's records (prior hospital, ED visit, and/or nursing home notes if available) were reviewed.  Additional history was obtained from EMS, family, and/or PCP (where available).  Escalation to admission/observation was considered.   Patient requires inpatient hospitalization - medicine.

## 2023-08-23 NOTE — H&P ADULT - NSHPREVIEWOFSYSTEMS_GEN_ALL_CORE
CONSTITUTIONAL: (+) weakness, No fevers or chills  EYES/ENT: No visual changes;  No vertigo or throat pain   NECK: No pain or stiffness  RESPIRATORY: No cough, wheezing, hemoptysis; No shortness of breath  CARDIOVASCULAR: No chest pain or palpitations  GASTROINTESTINAL: No abdominal or epigastric pain. No nausea, vomiting, or hematemesis; No diarrhea (+) constipation. No melena or hematochezia.  GENITOURINARY: No dysuria, frequency or hematuria  NEUROLOGICAL: (+) Weakness and stiffness mostly LUE and LLE  SKIN: No itching, rashes

## 2023-08-23 NOTE — ED ADULT NURSE NOTE - NSFALLHARMRISKINTERV_ED_ALL_ED

## 2023-08-23 NOTE — ED PROVIDER NOTE - NS ED ATTENDING STATEMENT MOD
This was a shared visit with the HARPREET. I reviewed and verified the documentation and independently performed the documented:

## 2023-08-23 NOTE — H&P ADULT - ASSESSMENT
73M with PMHx of hypertension, hyperlipidemia, CVA with left-sided weakness on aspirin (2014), Parkinson's disease, chronic right knee pain, BPH sent in from NH for unresponsiveness. Pt reports that he woke up at 5 am, went back to sleep, and then was reportedly unresponsive for 2 hours as per NH staff. Pt does not recall incident until he started regaining consciousness on his way to the ED. Pt states that since he went to the NH in April after being discharged for knee pain, his Parkinson's disease has been acting up more frequently. His baclofen was decreased from 20mg tid to 5mg tid which caused spasticity. He also endorses losing >15 lbs because he stopped eating as much since getting to the bathroom was a hassle and staff wasn't helpful.    #Altered mental status resolved  #Worsening stiffness related to Parkinson's Disease  #Hx of CVA w/ Left-sided deficits (2014)  - Pt reports worsening stiffness much more frequently recently especially on left side  - Episode of unresponsiveness for 2 hours, does not recall incident  - HD stable, labs unremarkable, HR was 54   - CTH and CTA -ve for acute pathology or LVO  - CXR unremarkable  - UA -ve, f/u UCx sent in ED, denies urinary symptoms  - Neurology following  - F/u rEEG  - c/w Sinemet 25/100 QID, ropinirole 1mg TID, cymbalta 30mg bid  - c/w baclofen 10mg TID (Was reduced from 20 -> 5 tid upon last discharge, slowly uptitrating as pt was having worsening spasticity)  - Follows with Dr. Dukes (neurology), was scheduled for carotid duplex and MRI brain in September  - Neuro follow up regarding MRI  - c/w ASA, statin  - Fall precautions  - PT/OT eval    #HTN/HLD  - Hold atenolol 100mg qd as pt was bradycardic  - c/w losartan 50, atorvastatin 20, ASA    #BPH  - c/w finasteride and flomax  - c/w oxybutynin     DVT ppx: Lovenox  GI ppx: Protonix  Diet: DASH/TLC  Activity: IAT  Dispo: Acute

## 2023-08-23 NOTE — ED PROVIDER NOTE - OBJECTIVE STATEMENT
Pt is 73 year old male with PMH noted in chart presents to ED with complaints of "unresponsive episode". Pt resides at [facility], sent in for witnessed unresponsive episode which lasted approx 2 hours. Does have Hx of CVA in past sent in to rule out CVA. Pt did arrived at ED responsive, a&ox3 with normal BSL. No head trauma reported. Denies any fever, chills, bodyaches, chest pain, sob, abdominal pain, NVCD

## 2023-08-23 NOTE — CONSULT NOTE ADULT - ASSESSMENT
Patient is a [age]y/o M with PMH of ischemic stroke ( 2014, left sided weakness) and PD (on sinemet QID), right knee arthritis, right shoulder tendon  tear who presents from the nursing home for an episode of unresponsiveness for 2 hours. Neurology was consulted for evaluation of the episode of unresponsiveness. Patient is currently back to baseline and has non focal neuro exam. Neuroimaging is negative for acute intracranial lesion or LVO. Given the prior history of stroke and the episode with LOC and some weakness upon regaining consciousness, seizure must be rule out.    Recs:  - rEEG  - call for new or change in neuro status    Patient is a [age]y/o M with PMH of ischemic stroke ( 2014, left sided weakness) and PD (on sinemet QID), right knee arthritis, right shoulder tendon  tear who presents from the nursing home for an episode of unresponsiveness for 2 hours. Neurology was consulted for evaluation of the episode of unresponsiveness. Patient is currently back to baseline and has non focal neuro exam. Neuroimaging is negative for acute intracranial lesion or LVO. Given the prior history of stroke and the episode with LOC and some weakness upon regaining consciousness, seizure must be rule out.    Recs:  - rEEG  - rest of the care per primary team   - call for new or change in neuro status

## 2023-08-23 NOTE — CONSULT NOTE ADULT - SUBJECTIVE AND OBJECTIVE BOX
NEUROLOGY CONSULT    HPI: Patient is a [age]y/o M with PMH of ischemic stroke ( 2014, left sided weakness) and PD (on sinemet QID), right knee arthritis, right shoulder tendon  tear who presents from the nursing home for an episode of unresponsiveness for 2 hours. Patient states that today in the morning the nursing home staff was was unable to wake him up and the patient finally gained consciousness while he was being transported to the hospital. Patient states feeling very weak but denies any episode of incontinence or tongue bite. Patient states that he worked as a  in the past and has been in the nursing  home since his right knee has been getting worse.       MEDICATIONS  Home Medications:  aluminum hydroxide-magnesium hydroxide 200 mg-200 mg/5 mL oral suspension: 30 milliliter(s) orally every 4 hours As needed Dyspepsia (04 Apr 2023 10:25)  atenolol 100 mg oral tablet: 1 tab(s) orally once a day (30 Mar 2023 17:46)  atorvastatin 20 mg oral tablet: 1 tab(s) orally once a day (at bedtime) (30 Mar 2023 17:46)  baclofen 5 mg oral tablet: 1 tab(s) orally 3 times a day (30 Mar 2023 17:46)  carbidopa-levodopa 25 mg-250 mg oral tablet: 1 tab(s) orally 4 times a day at 8AM,12PM,4PM, and 8PM  (30 Mar 2023 17:46)  finasteride 5 mg oral tablet: 1 tab(s) orally once a day (30 Mar 2023 17:46)  lactulose 10 g/15 mL oral syrup: 30 milliliter(s) orally once a day As needed constipation (04 Apr 2023 10:56)  losartan 50 mg oral tablet: 1 tab(s) orally once a day (30 Mar 2023 17:46)  naproxen 500 mg oral tablet: 1 tab(s) orally 2 times a day As needed Moderate Pain (4 - 6) (04 Apr 2023 10:25)  omeprazole 20 mg oral delayed release capsule: 1 cap(s) orally once a day (30 Mar 2023 17:46)  oxybutynin 5 mg oral tablet: 1 tab(s) orally 3 times a day (30 Mar 2023 17:46)  oxyCODONE 10 mg oral tablet: 1 tab(s) orally every 6 hours As needed Severe Pain (7 - 10) (04 Apr 2023 10:25)  rOPINIRole 1 mg oral tablet: 1 tab(s) orally 3 times a day (30 Mar 2023 17:46)  tamsulosin 0.4 mg oral capsule: 1 cap(s) orally once a day (at bedtime) (30 Mar 2023 17:46)  tiZANidine 2 mg oral tablet: 0.5 tab(s) orally every 8 hours (30 Mar 2023 17:46)    MEDICATIONS  (STANDING):  carbidopa/levodopa  25/250 1 Tablet(s) Oral Once    MEDICATIONS  (PRN):      FAMILY HISTORY:  No pertinent family history (Father, Mother)      Allergies    [This allergen will not trigger allergy alert] Originally Entered as [Unknown] reaction to [nuts, shrimp, strawberry] (Unknown)  No Known Drug Allergies    Intolerances        GEN: NAD, pleasant, cooperative    NEURO:   MENTAL STATUS: AAOx3  LANG/SPEECH: Fluent, intact naming, repetition & comprehension, dysarthric   CRANIAL NERVES:  II: Pupils equal and reactive, no RAPD  III, IV, VI: EOM intact, no gaze preference or deviation  V: normal  VII: asymmetry to the left side   VIII: normal hearing to speech  MOTOR: 5/5 in both upper extremities and 2/5 left LE, 5/5 right LE   REFLEXES: brisk reflexes throughout   SENSORY: Normal to touch, temperature & pin prick in all extremiteis  COORD: Normal finger to nose, no tremor, no dysmetria    LABS:                        14.8   4.54  )-----------( 177      ( 23 Aug 2023 10:26 )             43.8     08-23    141  |  104  |  13  ----------------------------<  100<H>  4.7   |  27  |  0.8    Ca    9.5      23 Aug 2023 10:26  Mg     1.8     08-23    TPro  6.2  /  Alb  4.1  /  TBili  1.1  /  DBili  x   /  AST  20  /  ALT  16  /  AlkPhos  67  08-23    Hemoglobin A1C:   Vitamin B12     CAPILLARY BLOOD GLUCOSE      POCT Blood Glucose.: 90 mg/dL (23 Aug 2023 09:30)      Urinalysis Basic - ( 23 Aug 2023 10:26 )    Color: x / Appearance: x / SG: x / pH: x  Gluc: 100 mg/dL / Ketone: x  / Bili: x / Urobili: x   Blood: x / Protein: x / Nitrite: x   Leuk Esterase: x / RBC: x / WBC x   Sq Epi: x / Non Sq Epi: x / Bacteria: x            Microbiology:      RADIOLOGY, EKG AND ADDITIONAL TESTS: Reviewed.

## 2023-08-23 NOTE — ED ADULT NURSE NOTE - NSFALLUNIVINTERV_ED_ALL_ED
Bed/Stretcher in lowest position, wheels locked, appropriate side rails in place/Call bell, personal items and telephone in reach/Instruct patient to call for assistance before getting out of bed/chair/stretcher/Non-slip footwear applied when patient is off stretcher/[place] to call system/Physically safe environment - no spills, clutter or unnecessary equipment/Purposeful proactive rounding/Room/bathroom lighting operational, light cord in reach

## 2023-08-23 NOTE — PATIENT PROFILE ADULT - FALL HARM RISK - HARM RISK INTERVENTIONS

## 2023-08-23 NOTE — ED ADULT NURSE REASSESSMENT NOTE - NS ED NURSE REASSESS COMMENT FT1
Pt. received from previous RN. Pt. lying on stretcher, breathing with ease on RA. On CCM. 20g noted to the R hand; IV intact, no redness/swelling at site. 20g noted to the L FA; IV intact, no redness/swelling at site. Awaiting results/MD dispo.

## 2023-08-23 NOTE — ED ADULT NURSE NOTE - CHIEF COMPLAINT QUOTE
Pt BIBA [facility] c/o unresponsive episode today. Pt was unable to be aroused/wake up x2 hrs. Pt now A&Ox3 in Triage, denies any acute symptoms.     FS 90 in Triage. As per EMS, last known well unknown, h/o CVA in the past, L sided weakness noted.

## 2023-08-23 NOTE — ED ADULT TRIAGE NOTE - CHIEF COMPLAINT QUOTE
Pt [facility] c/o unresponsive episode today. Pt was unable to be aroused/wake up x2 hrs. FS 90 in Triage. Unknown last known well as per EMS. Pt [facility] c/o unresponsive episode today. Pt was unable to be aroused/wake up x2 hrs. Pt now A&Ox3 in Triage, denies any acute symptoms.     FS 90 in Triage. As per EMS, last known well unknown, h/o CVA in the past, L sided weakness noted.

## 2023-08-23 NOTE — H&P ADULT - ATTENDING COMMENTS
Patient seen and examined at bedside independently of the residents. I read the resident's note and agree with the plan with the additions and corrections as noted below. My note supersedes the resident's note.     REVIEW OF SYSTEMS:  Negative except in HPI.     PMH: HTN, HLD, Parkinson disease, CVA (left sided weakness, on ASA),  chronic right knee pain, BPH    FHx: Reviewed. No fhx of asthma/copd, No fhx of liver and pulmonary disease. No fhx of hematological disorder.     Physical Exam:  GEN: No acute distress. Awake, Alert and oriented x 3.   Head: Atraumatic, Normocephalic.   Eye: PEERLA. No sclera icterus. EOMI.   ENT: Normal oropharynx, no thyromegaly, no mass, no lymphadenopathy.   LUNGS: Clear to auscultation bilaterally. No wheeze/rales/crackles.   HEART: Normal. S1/S2 present. RRR. No murmur/gallops.   ABD: Soft, non-tender, non-distended. Bowel sounds present.   EXT: No pitting edema. No erythema. No tenderness.  Integumentary: No rash, No sore, No petechia.   NEURO: CN III-XII intact. Strength: 5/5 b/l ULE. Sensory intact b/l ULE.     Vital Signs Last 24 Hrs  T(C): 36.7 (23 Aug 2023 19:52), Max: 36.8 (23 Aug 2023 09:28)  T(F): 98.1 (23 Aug 2023 19:52), Max: 98.2 (23 Aug 2023 09:28)  HR: 52 (23 Aug 2023 19:52) (52 - 54)  BP: 159/76 (23 Aug 2023 19:52) (144/77 - 159/76)  BP(mean): --  RR: 18 (23 Aug 2023 19:52) (18 - 18)  SpO2: 98% (23 Aug 2023 19:52) (97% - 98%)    Parameters below as of 23 Aug 2023 19:52  Patient On (Oxygen Delivery Method): room air      Please see the above notes for Labs and radiology.     Assessment and Plan:     74 yo M with hx of HTN, HLD, Parkinson disease, CVA (left sided weakness, on ASA),  chronic right knee pain, BPH sent in from NH for unresponsiveness.    Unresponsiveness/AMS   - CTH: neg for acute intracranial pathology.   - CTA unremarkable.   - UA neg. CXR unremarkable. No evidence of sepsis.   - check REEG   - monitor electrolytes (keep mg > 2)   - seizure precaution.   - f/u Neurology     Parkinson disease - on carbidopa/levodopa, ropinirole and baclofen   Hx of CVA - c/w ASA, statin   HTN - on losartan   Chronic right knee pain - pain control prn with bowel regimen  BPH - flomax and finasteride     DVT ppx: Lovenox SC  GI ppx:  PPI   Diet: DASH diet   Activity: as tolerated.     Date seen by the attendin2023  Total time spent: 75 minutes.

## 2023-08-23 NOTE — H&P ADULT - HISTORY OF PRESENT ILLNESS
73M with PMHx of hypertension, hyperlipidemia, CVA with left-sided weakness on aspirin (2014), Parkinson's disease, chronic right knee pain, BPH sent in from NH for unresponsiveness. Pt reports that he woke up at 5 am, went back to sleep, and then was reportedly unresponsive for 2 hours as per NH staff. Pt does not recall incident until he started regaining consciousness on his way to the ED. Pt states that since he went to the NH in April after being discharged for knee pain, his Parkinson's disease has been acting up more frequently. His baclofen was decreased from 20mg tid to 5mg tid which caused spasticity. He also endorses losing >15 lbs because he stopped eating as much since getting to the bathroom was a hassle and staff wasn't helpful. He follows with neurologist Dr. Dukes who recently increased baclofen back to 10mg TID. Was scheduled for carotid duplex and brain MRI in September. Pt denies CP, SOB, palpitations, abd pain, N/V/D (endorses constipation), or any  symptoms.     Pt reports getting much more frequent episodes of stiffness predominantly on his left side where his residual deficits from his prior CVA are. Those episodes have gotten more frequent to almost daily at this point. Denies wearing-off phenomenon of parkinson's meds, does not recall factors contributing to worsening stiffness.     In the ED: /77, HR 54, T 98F, RR 18 satting 97% on RA. Labs unremarkable. Trop 0.01, BNP wnl, EKG bradycardia. UA -ve.   CTH -ve for acute pathology, CTA no LVO. CXR unremarkable.   Pt seen by neurology in ED, recommended rEEG to r/o seizure.

## 2023-08-23 NOTE — ED PROVIDER NOTE - PHYSICAL EXAMINATION
Physical Exam    Vital Signs: I have reviewed the initial vital signs.  Constitutional: well-nourished, appears stated age, no acute distress  Eyes: Conjunctiva pink, Sclera clear, PERRLA, EOMI.  Cardiovascular: S1 and S2, regular rate, regular rhythm, well-perfused extremities, radial pulses equal and 2+  Respiratory: unlabored respiratory effort, clear to auscultation bilaterally no wheezing, rales and rhonchi  Gastrointestinal: soft, non-tender abdomen, no pulsatile mass, normal bowl sounds  Musculoskeletal: supple neck, no lower extremity edema, no midline tenderness  Integumentary: warm, dry, no rash  Neurologic: awake, alert, cranial nerves II-XII grossly intact, extremities’ motor and sensory functions grossly intact. L sided weakness noted compared to right from previous CVA  Psychiatric: appropriate mood, appropriate affect

## 2023-08-23 NOTE — CONSULT NOTE ADULT - ATTENDING COMMENTS
Patient seen and examined and agree with above except as noted.  Patients history, notes, labs, imaging, vitals and meds reviewed personally.  Patient with episode of difficulty to arouse in morning.  Currently improved  Unclear etiology of episode    Plan as above  Avoid missing doses of sinemet to prevent dopamine withdrawal

## 2023-08-23 NOTE — H&P ADULT - NSHPPHYSICALEXAM_GEN_ALL_CORE
General: NAD, AOx3  HEENT: Normocephalic, atraumatic  Lungs: Normal breath sounds, no wheezes/crackles  Heart: S1s2, no mrg  Abdomen: Soft, NT/ND. No rigidity/guarding  Extremities: Peripheral pulses +1, no cyanosis/edema  Neuro: Strength 3/5 in LUE and LLE, Strength 5/5 on right side. No cerebellar signs. CN intact.   Psych: AOx3, flat affect  Skin: No rashes or bruises

## 2023-08-23 NOTE — ED PROVIDER NOTE - ATTENDING APP SHARED VISIT CONTRIBUTION OF CARE
73-year-old female with a past medical history significant for Parkinson's and CVA with left-sided deficits hypertension hyperlipidemia who presents due to unresponsiveness. As per nursing home records patient was unresponsive for approximately 2 hours. When patient arrived to the ED patient is alert x3 and with no medical complaints. Patient states that he did not have any headaches chest pain or any complaints after waking. As per nursing home records no meds or intervention completed.    VITAL SIGNS: I have reviewed nursing notes and confirm.  CONSTITUTIONAL: non-toxic, well appearing  SKIN: no rash, no petechiae.  EYES: EOMI, pink conjunctiva, anicteric  ENT: tongue midline, no exudates, MMM  NECK: Supple; no meningismus, no JVD  CARD: RRR, no murmurs, equal radial pulses bilaterally 2+  RESP: CTAB, no respiratory distress  ABD: Soft, non-tender, non-distended, no peritoneal signs, no HSM, no CVA tenderness  EXT: Normal ROM x4. No edema. No calves tenderness  NEURO: Alert, oriented x3. CN2-12 intact, equal strength bilaterally    73-year-old male that presents due to concern for unresponsiveness Labs EKG imaging reassess dispo pending

## 2023-08-24 ENCOUNTER — TRANSCRIPTION ENCOUNTER (OUTPATIENT)
Age: 73
End: 2023-08-24

## 2023-08-24 VITALS — HEART RATE: 62 BPM | SYSTOLIC BLOOD PRESSURE: 116 MMHG | DIASTOLIC BLOOD PRESSURE: 65 MMHG

## 2023-08-24 LAB
ALBUMIN SERPL ELPH-MCNC: 3.7 G/DL — SIGNIFICANT CHANGE UP (ref 3.5–5.2)
ALP SERPL-CCNC: 70 U/L — SIGNIFICANT CHANGE UP (ref 30–115)
ALT FLD-CCNC: <5 U/L — SIGNIFICANT CHANGE UP (ref 0–41)
ANION GAP SERPL CALC-SCNC: 11 MMOL/L — SIGNIFICANT CHANGE UP (ref 7–14)
AST SERPL-CCNC: 15 U/L — SIGNIFICANT CHANGE UP (ref 0–41)
BASOPHILS # BLD AUTO: 0.03 K/UL — SIGNIFICANT CHANGE UP (ref 0–0.2)
BASOPHILS NFR BLD AUTO: 0.7 % — SIGNIFICANT CHANGE UP (ref 0–1)
BILIRUB SERPL-MCNC: 1.4 MG/DL — HIGH (ref 0.2–1.2)
BUN SERPL-MCNC: 12 MG/DL — SIGNIFICANT CHANGE UP (ref 10–20)
CALCIUM SERPL-MCNC: 9 MG/DL — SIGNIFICANT CHANGE UP (ref 8.4–10.5)
CHLORIDE SERPL-SCNC: 100 MMOL/L — SIGNIFICANT CHANGE UP (ref 98–110)
CO2 SERPL-SCNC: 22 MMOL/L — SIGNIFICANT CHANGE UP (ref 17–32)
CREAT SERPL-MCNC: 0.7 MG/DL — SIGNIFICANT CHANGE UP (ref 0.7–1.5)
CULTURE RESULTS: SIGNIFICANT CHANGE UP
EGFR: 98 ML/MIN/1.73M2 — SIGNIFICANT CHANGE UP
EOSINOPHIL # BLD AUTO: 0.06 K/UL — SIGNIFICANT CHANGE UP (ref 0–0.7)
EOSINOPHIL NFR BLD AUTO: 1.4 % — SIGNIFICANT CHANGE UP (ref 0–8)
GLUCOSE SERPL-MCNC: 119 MG/DL — HIGH (ref 70–99)
HCT VFR BLD CALC: 42.2 % — SIGNIFICANT CHANGE UP (ref 42–52)
HGB BLD-MCNC: 14.2 G/DL — SIGNIFICANT CHANGE UP (ref 14–18)
IMM GRANULOCYTES NFR BLD AUTO: 0.5 % — HIGH (ref 0.1–0.3)
LYMPHOCYTES # BLD AUTO: 1.01 K/UL — LOW (ref 1.2–3.4)
LYMPHOCYTES # BLD AUTO: 24.2 % — SIGNIFICANT CHANGE UP (ref 20.5–51.1)
MAGNESIUM SERPL-MCNC: 2 MG/DL — SIGNIFICANT CHANGE UP (ref 1.8–2.4)
MCHC RBC-ENTMCNC: 29.7 PG — SIGNIFICANT CHANGE UP (ref 27–31)
MCHC RBC-ENTMCNC: 33.6 G/DL — SIGNIFICANT CHANGE UP (ref 32–37)
MCV RBC AUTO: 88.3 FL — SIGNIFICANT CHANGE UP (ref 80–94)
MONOCYTES # BLD AUTO: 0.3 K/UL — SIGNIFICANT CHANGE UP (ref 0.1–0.6)
MONOCYTES NFR BLD AUTO: 7.2 % — SIGNIFICANT CHANGE UP (ref 1.7–9.3)
NEUTROPHILS # BLD AUTO: 2.76 K/UL — SIGNIFICANT CHANGE UP (ref 1.4–6.5)
NEUTROPHILS NFR BLD AUTO: 66 % — SIGNIFICANT CHANGE UP (ref 42.2–75.2)
NRBC # BLD: 0 /100 WBCS — SIGNIFICANT CHANGE UP (ref 0–0)
PLATELET # BLD AUTO: 156 K/UL — SIGNIFICANT CHANGE UP (ref 130–400)
PMV BLD: 10.8 FL — HIGH (ref 7.4–10.4)
POTASSIUM SERPL-MCNC: 3.9 MMOL/L — SIGNIFICANT CHANGE UP (ref 3.5–5)
POTASSIUM SERPL-SCNC: 3.9 MMOL/L — SIGNIFICANT CHANGE UP (ref 3.5–5)
PROT SERPL-MCNC: 5.7 G/DL — LOW (ref 6–8)
RBC # BLD: 4.78 M/UL — SIGNIFICANT CHANGE UP (ref 4.7–6.1)
RBC # FLD: 12.3 % — SIGNIFICANT CHANGE UP (ref 11.5–14.5)
SODIUM SERPL-SCNC: 133 MMOL/L — LOW (ref 135–146)
SPECIMEN SOURCE: SIGNIFICANT CHANGE UP
TSH SERPL-MCNC: 1.8 UIU/ML — SIGNIFICANT CHANGE UP (ref 0.27–4.2)
WBC # BLD: 4.18 K/UL — LOW (ref 4.8–10.8)
WBC # FLD AUTO: 4.18 K/UL — LOW (ref 4.8–10.8)

## 2023-08-24 PROCEDURE — 99222 1ST HOSP IP/OBS MODERATE 55: CPT

## 2023-08-24 PROCEDURE — 99239 HOSP IP/OBS DSCHRG MGMT >30: CPT

## 2023-08-24 RX ORDER — CHLORHEXIDINE GLUCONATE 213 G/1000ML
1 SOLUTION TOPICAL DAILY
Refills: 0 | Status: DISCONTINUED | OUTPATIENT
Start: 2023-08-24 | End: 2023-08-24

## 2023-08-24 RX ORDER — ATENOLOL 25 MG/1
25 TABLET ORAL DAILY
Refills: 0 | Status: DISCONTINUED | OUTPATIENT
Start: 2023-08-24 | End: 2023-08-24

## 2023-08-24 RX ADMIN — PANTOPRAZOLE SODIUM 40 MILLIGRAM(S): 20 TABLET, DELAYED RELEASE ORAL at 05:52

## 2023-08-24 RX ADMIN — ROPINIROLE 1 MILLIGRAM(S): 8 TABLET, FILM COATED, EXTENDED RELEASE ORAL at 13:23

## 2023-08-24 RX ADMIN — FINASTERIDE 5 MILLIGRAM(S): 5 TABLET, FILM COATED ORAL at 12:34

## 2023-08-24 RX ADMIN — ATENOLOL 25 MILLIGRAM(S): 25 TABLET ORAL at 16:34

## 2023-08-24 RX ADMIN — CARBIDOPA AND LEVODOPA 1 TABLET(S): 25; 100 TABLET ORAL at 12:31

## 2023-08-24 RX ADMIN — Medication 81 MILLIGRAM(S): at 12:30

## 2023-08-24 RX ADMIN — DULOXETINE HYDROCHLORIDE 30 MILLIGRAM(S): 30 CAPSULE, DELAYED RELEASE ORAL at 05:50

## 2023-08-24 RX ADMIN — Medication 10 MILLIGRAM(S): at 05:50

## 2023-08-24 RX ADMIN — CHLORHEXIDINE GLUCONATE 1 APPLICATION(S): 213 SOLUTION TOPICAL at 12:32

## 2023-08-24 RX ADMIN — Medication 5 MILLIGRAM(S): at 13:23

## 2023-08-24 RX ADMIN — ENOXAPARIN SODIUM 40 MILLIGRAM(S): 100 INJECTION SUBCUTANEOUS at 05:52

## 2023-08-24 RX ADMIN — Medication 5 MILLIGRAM(S): at 05:50

## 2023-08-24 RX ADMIN — CARBIDOPA AND LEVODOPA 1 TABLET(S): 25; 100 TABLET ORAL at 16:34

## 2023-08-24 RX ADMIN — CARBIDOPA AND LEVODOPA 1 TABLET(S): 25; 100 TABLET ORAL at 07:56

## 2023-08-24 RX ADMIN — ROPINIROLE 1 MILLIGRAM(S): 8 TABLET, FILM COATED, EXTENDED RELEASE ORAL at 05:50

## 2023-08-24 RX ADMIN — Medication 10 MILLIGRAM(S): at 13:23

## 2023-08-24 RX ADMIN — LOSARTAN POTASSIUM 50 MILLIGRAM(S): 100 TABLET, FILM COATED ORAL at 05:52

## 2023-08-24 RX ADMIN — POLYETHYLENE GLYCOL 3350 17 GRAM(S): 17 POWDER, FOR SOLUTION ORAL at 12:34

## 2023-08-24 RX ADMIN — DULOXETINE HYDROCHLORIDE 30 MILLIGRAM(S): 30 CAPSULE, DELAYED RELEASE ORAL at 17:12

## 2023-08-24 NOTE — DISCHARGE NOTE PROVIDER - NSDCMRMEDTOKEN_GEN_ALL_CORE_FT
aspirin 81 mg oral delayed release capsule: 1 cap(s) orally once a day  atenolol 100 mg oral tablet: 1 tab(s) orally once a day  atorvastatin 20 mg oral tablet: 1 tab(s) orally once a day (at bedtime)  baclofen 10 mg oral tablet: 1 tab(s) orally 3 times a day  carbidopa-levodopa 25 mg-250 mg oral tablet: 1 tab(s) orally 4 times a day at 8AM,12PM,4PM, and 8PM   Cymbalta 30 mg oral delayed release capsule: 1 cap(s) orally 2 times a day  finasteride 5 mg oral tablet: 1 tab(s) orally once a day  losartan 50 mg oral tablet: 1 tab(s) orally once a day  MiraLax oral powder for reconstitution: 17 gram(s) orally once a day  omeprazole 20 mg oral delayed release capsule: 1 cap(s) orally once a day  oxybutynin 5 mg oral tablet: 1 tab(s) orally 3 times a day  oxyCODONE 5 mg oral tablet: 1 tab(s) orally every 6 hours as needed for  pain  rOPINIRole 1 mg oral tablet: 1 tab(s) orally 3 times a day  tamsulosin 0.4 mg oral capsule: 1 cap(s) orally once a day (at bedtime)

## 2023-08-24 NOTE — DISCHARGE NOTE PROVIDER - CARE PROVIDER_API CALL
Rasta Galarza  Internal Medicine  [address] [place] [address]  Phone: [phone]  Fax: [phone]  Follow Up Time:

## 2023-08-24 NOTE — DISCHARGE NOTE NURSING/CASE MANAGEMENT/SOCIAL WORK - PATIENT PORTAL LINK FT
You can access the FollowMyHealth Patient Portal offered by [facility] by registering at the following website: [email or web address]. By joining [email or web address]’s FollowMyHealth portal, you will also be able to view your health information using other applications (apps) compatible with our system.

## 2023-08-24 NOTE — PHYSICAL THERAPY INITIAL EVALUATION ADULT - GENERAL OBSERVATIONS, REHAB EVAL
PT IE [id number]. Chart reviewed. Pt encountered semi-reclined in bed. In NAD. + IV lock, + condom cath in place

## 2023-08-24 NOTE — PHYSICAL THERAPY INITIAL EVALUATION ADULT - LIVES WITH, PROFILE
per pt the current admission is from NH. Per pt he is moctly wheel chair bound and ambulates short distances using RW with PT

## 2023-08-24 NOTE — OCCUPATIONAL THERAPY INITIAL EVALUATION ADULT - SPECIFY REASON(S)
Pt refused OT eval today stating extremely tired and "PT already done the eval, not today" despite max encouragement and education. RN aware, will f/u tomorrow.

## 2023-08-24 NOTE — DISCHARGE NOTE PROVIDER - NSDCFUSCHEDAPPT_GEN_ALL_CORE_FT
Unknown, Doctor  [facility] PreAdmits  Scheduled Appointment: 09/11/2023    [facility]  MRI SI [address]  Scheduled Appointment: 09/11/2023    Claudia-Mayur Hensley  [facility]  [address]  Scheduled Appointment: 11/21/2023

## 2023-08-24 NOTE — PROGRESS NOTE ADULT - SUBJECTIVE AND OBJECTIVE BOX
DEMETRICE LANDRUM  73y, Male  Allergy: [This allergen will not trigger allergy alert] Originally Entered as [Unknown] reaction to [nuts, shrimp, strawberry] (Unknown)  No Known Drug Allergies    Hospital Day: 1d    Patient seen and examined earlier today. no major complaints  pending EEG today.     PMH/PSH:  PAST MEDICAL & SURGICAL HISTORY:  HTN (hypertension)      Dyslipidemia          LAST 24-Hr EVENTS:    VITALS:  T(F): 96.9 (08-24-23 @ 14:12), Max: 98.1 (08-23-23 @ 19:52)  HR: 57 (08-24-23 @ 14:12)  BP: 132/64 (08-24-23 @ 14:12) (118/72 - 159/76)  RR: 18 (08-24-23 @ 14:12)  SpO2: 96% (08-24-23 @ 08:22)          TESTS & MEASUREMENTS:  Weight/BMI  73.5 (08-23-23 @ 09:28)  23.9 (08-23-23 @ 09:28)                          14.2   4.18  )-----------( 156      ( 24 Aug 2023 10:02 )             42.2         08-24    133<L>  |  100  |  12  ----------------------------<  119<H>  3.9   |  22  |  0.7    Ca    9.0      24 Aug 2023 10:02  Mg     2.0     08-24    TPro  5.7<L>  /  Alb  3.7  /  TBili  1.4<H>  /  DBili  x   /  AST  15  /  ALT  <5  /  AlkPhos  70  08-24    LIVER FUNCTIONS - ( 24 Aug 2023 10:02 )  Alb: 3.7 g/dL / Pro: 5.7 g/dL / ALK PHOS: 70 U/L / ALT: <5 U/L / AST: 15 U/L / GGT: x           CARDIAC MARKERS ( 23 Aug 2023 10:26 )  x     / <0.01 ng/mL / x     / x     / x            Urinalysis Basic - ( 24 Aug 2023 10:02 )    Color: x / Appearance: x / SG: x / pH: x  Gluc: 119 mg/dL / Ketone: x  / Bili: x / Urobili: x   Blood: x / Protein: x / Nitrite: x   Leuk Esterase: x / RBC: x / WBC x   Sq Epi: x / Non Sq Epi: x / Bacteria: x                    A1C with Estimated Average Glucose Result: 7.3 % (03-31-23 @ 07:29)  A1C with Estimated Average Glucose Result: 7.7 % (02-13-23 @ 07:56)          RADIOLOGY, ECG, & ADDITIONAL TESTS:  12 Lead ECG:   Ventricular Rate 53 BPM    Atrial Rate 52 BPM    QRS Duration 72 ms    Q-T Interval 418 ms    QTC Calculation(Bazett) 392 ms    R Axis 77 degrees    T Axis 72 degrees    Diagnosis Line Junctional rhythm  Cannot rule out Anterior infarct , age undetermined  Abnormal ECG    Confirmed by Mason Saucedo ([id number]) on 8/23/2023 1:58:57 PM (08-23-23 @ 11:52)    CT Head No Cont:   ACC: [id number] EXAM:  CT BRAIN   ORDERED BY: RAFI MCCRACKEN     PROCEDURE DATE:  08/23/2023          INTERPRETATION:  CLINICAL INDICATION: Altered mental status.    TECHNIQUE: CT of the head was performed without the administration of   intravenous contrast.    COMPARISON: CT head dated 3/20/2023.    FINDINGS:    There is stable prominence of the sulci, sylvian fissures, and ventricles   likely reflecting mild diffuse parenchymal volume loss.    There is no evidence of acute territorial infarct orintracranial   hemorrhage. There is no space-occupying lesion or midline shift.    There is no evidence of hydrocephalus. There are no extra-axial fluid   collections.    The visualized intraorbital contents are normal. Mild mucosal thickening   in bilateral ethmoid sinuses. Small retention cyst in the left maxillary   sinus. The mastoid air cells are aerated. The visualized soft tissues and   osseous structures appear normal.      IMPRESSION:    No acute intracranial pathology.    --- End of Report ---            EDYTA DEWEY MD; Attending Radiologist  This document has been electronically signed. Aug 23 2023 10:47AM (08-23-23 @ 10:21)    RECENT DIAGNOSTIC ORDERS:  Vitamin B12, Serum: 11:00 (08-24-23 @ 09:30)  Folate, Serum: 11:00 (08-24-23 @ 09:30)  Thyroid Stimulating Hormone w/FT4 Reflex: 11:00 (08-24-23 @ 09:29)  EEG Awake or Drowsy:   Reson for this test: unresponsiveness  Transport: Portable  Hemorrhage:No  Brain Death: No  MI:No  Sickle Cell:No   Stimulants:No  Anti-Convulsants:No   Anti-Depressants:No  Contr Substances:No (08-23-23 @ 21:18)  Diet, DASH/TLC:   Sodium & Cholesterol Restricted (08-23-23 @ 19:51)  Folate, Serum: AM Sched. Collection: 24-Aug-2023 04:30 (08-23-23 @ 19:47)  Vitamin B12, Serum: AM Sched. Collection: 24-Aug-2023 04:30 (08-23-23 @ 19:47)  12 Lead ECG (08-23-23 @ 19:47)      MEDICATIONS:  MEDICATIONS  (STANDING):  aspirin  chewable 81 milliGRAM(s) Oral daily  atenolol  Tablet 25 milliGRAM(s) Oral daily  atorvastatin 20 milliGRAM(s) Oral at bedtime  baclofen 10 milliGRAM(s) Oral every 8 hours  carbidopa/levodopa  25/250 1 Tablet(s) Oral <User Schedule>  chlorhexidine 2% Cloths 1 Application(s) Topical daily  DULoxetine 30 milliGRAM(s) Oral every 12 hours  enoxaparin Injectable 40 milliGRAM(s) SubCutaneous every 24 hours  finasteride 5 milliGRAM(s) Oral daily  losartan 50 milliGRAM(s) Oral daily  oxybutynin 5 milliGRAM(s) Oral three times a day  pantoprazole    Tablet 40 milliGRAM(s) Oral before breakfast  polyethylene glycol 3350 17 Gram(s) Oral daily  rOPINIRole 1 milliGRAM(s) Oral every 8 hours  tamsulosin 0.4 milliGRAM(s) Oral at bedtime    MEDICATIONS  (PRN):  acetaminophen     Tablet .. 650 milliGRAM(s) Oral every 6 hours PRN Temp greater or equal to 38C (100.4F), Mild Pain (1 - 3)  melatonin 5 milliGRAM(s) Oral at bedtime PRN Insomnia      HOME MEDICATIONS:  aspirin 81 mg oral delayed release capsule (08-23)  atenolol 100 mg oral tablet (08-23)  atorvastatin 20 mg oral tablet (08-23)  baclofen 10 mg oral tablet (08-23)  carbidopa-levodopa 25 mg-250 mg oral tablet (08-23)  Cymbalta 30 mg oral delayed release capsule (08-23)  finasteride 5 mg oral tablet (08-23)  losartan 50 mg oral tablet (08-23)  MiraLax oral powder for reconstitution (08-23)  omeprazole 20 mg oral delayed release capsule (08-23)  oxybutynin 5 mg oral tablet (08-23)  oxyCODONE 5 mg oral tablet (08-23)  rOPINIRole 1 mg oral tablet (08-23)  tamsulosin 0.4 mg oral capsule (08-23)      PHYSICAL EXAM:  GENERAL: Patient lying on bed, comfortable   CHEST/LUNG: NVB, no wheezing   HEART: R1+R2, RRR  ABDOMEN: Soft. non tender, BS positive  EXTREMITIES:  no edema, Bruising

## 2023-08-24 NOTE — DISCHARGE NOTE PROVIDER - HOSPITAL COURSE
73M with PMHx of hypertension, hyperlipidemia, CVA with left-sided weakness on aspirin (2014), Parkinson's disease, chronic right knee pain, BPH sent in from NH for unresponsiveness. Pt reports that he woke up at 5 am, went back to sleep, and then was reportedly unresponsive for 2 hours as per NH staff. Pt does not recall incident until he started regaining consciousness on his way to the ED. Pt states that since he went to the NH in April after being discharged for knee pain, his Parkinson's disease has been acting up more frequently. His baclofen was decreased from 20mg tid to 5mg tid which caused spasticity. He also endorses losing >15 lbs because he stopped eating as much since getting to the bathroom was a hassle and staff wasn't helpful. He follows with neurologist Dr. Dukes who recently increased baclofen back to 10mg TID. Was scheduled for carotid duplex and brain MRI in September. Pt denies CP, SOB, palpitations, abd pain, N/V/D (endorses constipation), or any  symptoms.     Pt reports getting much more frequent episodes of stiffness predominantly on his left side where his residual deficits from his prior CVA are. Those episodes have gotten more frequent to almost daily at this point. Denies wearing-off phenomenon of parkinson's meds, does not recall factors contributing to worsening stiffness.     In the ED: /77, HR 54, T 98F, RR 18 satting 97% on RA. Labs unremarkable. Trop 0.01, BNP wnl, EKG bradycardia. UA -ve.   CTH -ve for acute pathology, CTA no LVO. CXR unremarkable.   Pt seen by neurology in ED, recommended rEEG to r/o seizure.     rEEG was unremarkable and patient is medically stable for discharge back home on same medications with regularly scheduled follow up appointments.

## 2023-08-24 NOTE — EEG REPORT - NS EEG TEXT BOX
Patient Name:	DEMETRICE LANDRUM    :	1950  MRN:	[id number]  Study Date/Time:	2023, 11:55:18 AM  Referred by:	-    Brief Clinical History:  DEMETRICE LANDRUM is a 73 year old Male; study performed to investigate for seizures or markers of epilepsy.   Diagnosis Code: R40.4 Transient alteration of awareness  CPT:  09768 (awake/drowsy)     Patient Medication:  Melatonin    Tylenol    Lioresal    Lipitor    Tenormin    Aspirin    Flomax    Requip    Miralax    Protonix    Ditropan    Cozaar    Proscar    Lovenox    Cymbalta    Sinemet      Acquisition Details:  Electroencephalography was acquired using a minimum of 21 channels on an [employer] system v 9.3.1 with electrode placement according to the standard International 10-20 system following ACNS (American Clinical Neurophysiology Society) guidelines.  Anterior temporal T1 and T2 electrodes were utilized whenever possible.   The [employer] automated spike & seizure detections were all reviewed in detail, in addition to the entire raw EEG.    Findings:  Background:  continuous.  Voltage:  Normal (20uV)  Organization:  Appropriate anterior-posterior gradient  Posterior Dominant Rhythm:  8.5 Hz ,symmetrical  Variability:  Yes	Reactivity:  Yes  Sleep:  none.    Interictal Activity:  None  Location:  as above  Focal Slowing:  None  Generalized Slowing:  Choose an item.  Events:  1)	No electrographic seizures or significant clinical events.  Provocations:  1)	Hyperventilation: was not performed.  2)	Photic stimulation: was not performed.  Impression:  normal    Clinical Correlation:  Normal study does not exclude diagnosis of seizure disorder    Anrold Hernandez MD  Attending Neurologist, Division of Epilepsy

## 2023-08-24 NOTE — DISCHARGE NOTE NURSING/CASE MANAGEMENT/SOCIAL WORK - NSDCPEFALRISK_GEN_ALL_CORE
For information on Fall & Injury Prevention, visit: [email or web address] OR  [email or web address] OR  https://www.cdc.gov/steadi/patient.html

## 2023-08-24 NOTE — DISCHARGE NOTE PROVIDER - NSDCCPCAREPLAN_GEN_ALL_CORE_FT
PRINCIPAL DISCHARGE DIAGNOSIS  Diagnosis: Unresponsive episode  Assessment and Plan of Treatment: You were found to have an unresponsive episode while residing at your nursing home. All imaging and diagnostic studies were negative for acute pathology including stroke and seizure. Please take your medications as prescribed and follow up with your primary doctor or the one provided in your discharge paperwork, and all of your other scheduled appointments

## 2023-08-24 NOTE — PROGRESS NOTE ADULT - ASSESSMENT
74 yo M with hx of HTN, HLD, Parkinson disease, CVA (left sided weakness, on ASA),  chronic right knee pain, BPH sent in from NH for unresponsiveness.    Unresponsiveness/AMS , improved   - CTH: neg for acute intracranial pathology.   - CTA unremarkable.   - UA neg. CXR unremarkable. No evidence of sepsis.   - check REEG   - monitor electrolytes (keep mg > 2)   - seizure precaution.   - f/u Neurology recommendations  EEG pending     Parkinson disease - on carbidopa/levodopa, ropinirole and baclofen   Hx of CVA - c/w ASA, statin   HTN - on losartan   Chronic right knee pain - pain control prn with bowel regimen  BPH - flomax and finasteride     DVT ppx: Lovenox SC  GI ppx:  PPI   Diet: DASH diet   Activity: as tolerated.     follow up: follow PT, follow neurology

## 2023-08-24 NOTE — DISCHARGE NOTE PROVIDER - ATTENDING DISCHARGE PHYSICAL EXAMINATION:
Patient seen at bedside. back ot baseline. neurology cleared patient for discharge. EEG negative. patient to follow up with PCP and specialists as scheduled. patient will be discharged back to facility .

## 2023-08-25 LAB
FOLATE SERPL-MCNC: 14.8 NG/ML — SIGNIFICANT CHANGE UP
VIT B12 SERPL-MCNC: 514 PG/ML — SIGNIFICANT CHANGE UP (ref 232–1245)

## 2023-09-01 NOTE — H&P ADULT - NSICDXFAMHXNEG_GEN_ALL
not pertinent Terbinafine Counseling: Patient counseling regarding adverse effects of terbinafine including but not limited to headache, diarrhea, rash, upset stomach, liver function test abnormalities, itching, taste/smell disturbance, nausea, abdominal pain, and flatulence.  There is a rare possibility of liver failure that can occur when taking terbinafine.  The patient understands that a baseline LFT and kidney function test may be required. The patient verbalized understanding of the proper use and possible adverse effects of terbinafine.  All of the patient's questions and concerns were addressed.

## 2023-09-05 DIAGNOSIS — G89.29 OTHER CHRONIC PAIN: ICD-10-CM

## 2023-09-05 DIAGNOSIS — I10 ESSENTIAL (PRIMARY) HYPERTENSION: ICD-10-CM

## 2023-09-05 DIAGNOSIS — Z79.82 LONG TERM (CURRENT) USE OF ASPIRIN: ICD-10-CM

## 2023-09-05 DIAGNOSIS — G20 PARKINSON'S DISEASE: ICD-10-CM

## 2023-09-05 DIAGNOSIS — M25.561 PAIN IN RIGHT KNEE: ICD-10-CM

## 2023-09-05 DIAGNOSIS — R40.4 TRANSIENT ALTERATION OF AWARENESS: ICD-10-CM

## 2023-09-05 DIAGNOSIS — E78.5 HYPERLIPIDEMIA, UNSPECIFIED: ICD-10-CM

## 2023-09-05 DIAGNOSIS — Z79.899 OTHER LONG TERM (CURRENT) DRUG THERAPY: ICD-10-CM

## 2023-09-05 DIAGNOSIS — N40.0 BENIGN PROSTATIC HYPERPLASIA WITHOUT LOWER URINARY TRACT SYMPTOMS: ICD-10-CM

## 2023-09-05 DIAGNOSIS — I69.354 HEMIPLEGIA AND HEMIPARESIS FOLLOWING CEREBRAL INFARCTION AFFECTING LEFT NON-DOMINANT SIDE: ICD-10-CM

## 2023-09-07 ENCOUNTER — APPOINTMENT (OUTPATIENT)
Dept: ORTHOPEDIC SURGERY | Facility: CLINIC | Age: 73
End: 2023-09-07

## 2023-09-11 ENCOUNTER — OUTPATIENT (OUTPATIENT)
Dept: OUTPATIENT SERVICES | Facility: HOSPITAL | Age: 73
LOS: 1 days | End: 2023-09-11
Payer: MEDICARE

## 2023-09-11 DIAGNOSIS — Z00.8 ENCOUNTER FOR OTHER GENERAL EXAMINATION: ICD-10-CM

## 2023-09-11 DIAGNOSIS — R51.9 HEADACHE, UNSPECIFIED: ICD-10-CM

## 2023-09-11 PROCEDURE — 70551 MRI BRAIN STEM W/O DYE: CPT | Mod: 26,MH

## 2023-09-11 PROCEDURE — 70551 MRI BRAIN STEM W/O DYE: CPT | Mod: MH

## 2023-09-12 DIAGNOSIS — R51.9 HEADACHE, UNSPECIFIED: ICD-10-CM

## 2023-11-21 ENCOUNTER — APPOINTMENT (OUTPATIENT)
Dept: ORTHOPEDIC SURGERY | Facility: CLINIC | Age: 73
End: 2023-11-21
Payer: MEDICARE

## 2023-11-21 PROCEDURE — 99213 OFFICE O/P EST LOW 20 MIN: CPT | Mod: 25

## 2023-11-21 PROCEDURE — 20610 DRAIN/INJ JOINT/BURSA W/O US: CPT | Mod: RT

## 2024-03-12 ENCOUNTER — APPOINTMENT (OUTPATIENT)
Dept: ORTHOPEDIC SURGERY | Facility: CLINIC | Age: 74
End: 2024-03-12
Payer: MEDICARE

## 2024-03-12 PROCEDURE — 20610 DRAIN/INJ JOINT/BURSA W/O US: CPT | Mod: RT

## 2024-03-12 PROCEDURE — 99213 OFFICE O/P EST LOW 20 MIN: CPT | Mod: 25

## 2024-03-12 NOTE — DISCUSSION/SUMMARY
[de-identified] : Plan is for cortisone injection RT knee  Dexamethasone and Lidocaine    Anesthesia: ethyl chloride sprayed topically. Dexamethasone 20mg per 5mL 3cc   Lidocaine 1% 2cc        Medication was injected in the right knee after verbal consent using sterile preparation and technique. The risks, benefits, and alternatives to cortisone injection were explained in full to the patient. Risks outlined include but are not limited to infection, sepsis, bleeding, scarring, skin discoloration, temporary increase in pain, syncopal episode, failure to resolve symptoms, allergic reaction, symptom recurrence, and elevation of blood sugar in diabetics. Patient understood the risks. All questions were answered. After discussion of options, patient requested an injection. Oral informed consent was obtained and sterile prep was done of the injection site. Sterile technique was utilized for the procedure including the preparation of the solutions used for the injection. Patient tolerated the procedure well. Advised to ice the injection site this evening.   f/u 3-4 months , PT script given

## 2024-03-12 NOTE — HISTORY OF PRESENT ILLNESS
[de-identified] : Patient patient is a 73-year-old male with history of Parkinson's, CVS, left-sided weakness.  Patient has history of severe osteoarthritis of right knee.  Patient was discussed in the past that he was not a candidate for total knee replacement, in the past cortisone has helped the patient.  Patient interested in repeat cortisone injection.  Patient has difficulty with ADLs secondary to knee pain.  Pain is related to activity but improves with rest  Exam of right knee shows medial lateral joint line tenderness, guarding with range of motion, decreased quad extension strength, no contracture, no gross instability.  Past imaging of right knee x-ray shows advanced osteoarthritis.

## 2024-05-15 NOTE — ED PROVIDER NOTE - NS ED MD DISPO DIVISION
[facility]
Assistance OOB with selected safe patient handling equipment if applicable/Communicate risk of Fall with Harm to all staff, patient, and family/Monitor gait and stability/Provide patient with walking aids/Provide visual cue: red socks, yellow wristband, yellow gown, etc/Reinforce activity limits and safety measures with patient and family/Bed in lowest position, wheels locked, appropriate side rails in place/Call bell, personal items and telephone in reach/Instruct patient to call for assistance before getting out of bed/chair/stretcher/Non-slip footwear applied when patient is off stretcher/Wheeler to call system/Physically safe environment - no spills, clutter or unnecessary equipment/Purposeful Proactive Rounding/Room/bathroom lighting operational, light cord in reach

## 2024-06-18 ENCOUNTER — APPOINTMENT (OUTPATIENT)
Dept: ORTHOPEDIC SURGERY | Facility: CLINIC | Age: 74
End: 2024-06-18
Payer: MEDICARE

## 2024-06-18 DIAGNOSIS — M17.11 UNILATERAL PRIMARY OSTEOARTHRITIS, RIGHT KNEE: ICD-10-CM

## 2024-06-18 PROCEDURE — 99213 OFFICE O/P EST LOW 20 MIN: CPT | Mod: 25

## 2024-06-18 PROCEDURE — 20610 DRAIN/INJ JOINT/BURSA W/O US: CPT | Mod: RT

## 2024-06-18 NOTE — HISTORY OF PRESENT ILLNESS
[de-identified] : Patient patient is a 74-year-old male with history of Parkinson's, CVS, left-sided weakness.  Patient has history of severe osteoarthritis of right knee.  Patient was discussed in the past that he was not a candidate for total knee replacement, in the past cortisone has helped the patient.  Patient interested in repeat cortisone injection.  Patient has difficulty with ADLs secondary to knee pain.  Pain is related to activity but improves with rest  Exam of right knee shows medial lateral joint line tenderness, guarding with range of motion, decreased quad extension strength, no contracture, no gross instability.  Past imaging of right knee x-ray shows advanced osteoarthritis.

## 2024-06-18 NOTE — DISCUSSION/SUMMARY
[de-identified] : Plan is for cortisone injection RT knee  Dexamethasone and Lidocaine    Anesthesia: ethyl chloride sprayed topically. Dexamethasone 20mg per 5mL 3cc   Lidocaine 1% 2cc        Medication was injected in the right knee after verbal consent using sterile preparation and technique. The risks, benefits, and alternatives to cortisone injection were explained in full to the patient. Risks outlined include but are not limited to infection, sepsis, bleeding, scarring, skin discoloration, temporary increase in pain, syncopal episode, failure to resolve symptoms, allergic reaction, symptom recurrence, and elevation of blood sugar in diabetics. Patient understood the risks. All questions were answered. After discussion of options, patient requested an injection. Oral informed consent was obtained and sterile prep was done of the injection site. Sterile technique was utilized for the procedure including the preparation of the solutions used for the injection. Patient tolerated the procedure well. Advised to ice the injection site this evening.   f/u 3-4 months

## 2024-06-19 NOTE — ED ADULT TRIAGE NOTE - PRO INTERPRETER NEED 2
Hpi Title: Evaluation of Skin Lesions How Severe Are Your Spot(S)?: mild Have Your Spot(S) Been Treated In The Past?: has not been treated English

## 2024-08-19 NOTE — OCCUPATIONAL THERAPY INITIAL EVALUATION ADULT - PLANNED THERAPY INTERVENTIONS, OT EVAL
ADL retraining/IADL retraining/balance training/bed mobility training/fine motor coordination training/parent/caregiver training.../ROM/strengthening/stretching/transfer training n/a

## 2024-09-03 ENCOUNTER — APPOINTMENT (OUTPATIENT)
Dept: ORTHOPEDIC SURGERY | Facility: CLINIC | Age: 74
End: 2024-09-03

## 2024-09-24 ENCOUNTER — APPOINTMENT (OUTPATIENT)
Dept: ORTHOPEDIC SURGERY | Facility: CLINIC | Age: 74
End: 2024-09-24
Payer: MEDICARE

## 2024-09-24 DIAGNOSIS — M17.11 UNILATERAL PRIMARY OSTEOARTHRITIS, RIGHT KNEE: ICD-10-CM

## 2024-09-24 PROCEDURE — 99213 OFFICE O/P EST LOW 20 MIN: CPT | Mod: 25

## 2024-09-24 PROCEDURE — 20610 DRAIN/INJ JOINT/BURSA W/O US: CPT | Mod: RT

## 2024-09-24 NOTE — HISTORY OF PRESENT ILLNESS
[de-identified] : Patient is a 74-year-old male with history of Parkinson's, CVS, left-sided weakness.  Patient has history of severe osteoarthritis of right knee.  Patient was discussed in the past that he was not a candidate for total knee replacement, in the past cortisone has helped the patient.  Patient interested in repeat cortisone injection.  Patient has difficulty with ADLs secondary to knee pain.  Pain is related to activity but improves with rest  Exam of right knee shows medial lateral joint line tenderness, guarding with range of motion, decreased quad extension strength, no contracture, no gross instability.  Past imaging of right knee x-ray shows advanced osteoarthritis.  Plan is for cortisone injection RT knee  Dexamethasone and Lidocaine    Anesthesia: ethyl chloride sprayed topically. Dexamethasone 20mg per 5mL 3cc   Lidocaine 1% 2cc    Medication was injected in the right knee after verbal consent using sterile preparation and technique. The risks, benefits, and alternatives to cortisone injection were explained in full to the patient. Risks outlined include but are not limited to infection, sepsis, bleeding, scarring, skin discoloration, temporary increase in pain, syncopal episode, failure to resolve symptoms, allergic reaction, symptom recurrence, and elevation of blood sugar in diabetics. Patient understood the risks. All questions were answered. After discussion of options, patient requested an injection. Oral informed consent was obtained and sterile prep was done of the injection site. Sterile technique was utilized for the procedure including the preparation of the solutions used for the injection. Patient tolerated the procedure well. Advised to ice the injection site this evening.   f/u 4 months

## 2024-09-25 ENCOUNTER — OUTPATIENT (OUTPATIENT)
Dept: OUTPATIENT SERVICES | Facility: HOSPITAL | Age: 74
LOS: 1 days | End: 2024-09-25
Payer: MEDICARE

## 2024-09-25 DIAGNOSIS — Z00.8 ENCOUNTER FOR OTHER GENERAL EXAMINATION: ICD-10-CM

## 2024-09-25 PROCEDURE — 72197 MRI PELVIS W/O & W/DYE: CPT

## 2024-09-25 PROCEDURE — A9579: CPT

## 2024-09-25 PROCEDURE — 72197 MRI PELVIS W/O & W/DYE: CPT | Mod: 26,MH

## 2024-09-26 DIAGNOSIS — Z00.8 ENCOUNTER FOR OTHER GENERAL EXAMINATION: ICD-10-CM

## 2025-01-24 ENCOUNTER — APPOINTMENT (OUTPATIENT)
Dept: ORTHOPEDIC SURGERY | Facility: CLINIC | Age: 75
End: 2025-01-24
Payer: MEDICARE

## 2025-01-24 DIAGNOSIS — M17.11 UNILATERAL PRIMARY OSTEOARTHRITIS, RIGHT KNEE: ICD-10-CM

## 2025-01-24 PROCEDURE — 29075 APPL CST ELBW FNGR SHORT ARM: CPT | Mod: RT

## 2025-01-24 PROCEDURE — 99213 OFFICE O/P EST LOW 20 MIN: CPT | Mod: 25

## 2025-05-27 ENCOUNTER — APPOINTMENT (OUTPATIENT)
Dept: ORTHOPEDIC SURGERY | Facility: CLINIC | Age: 75
End: 2025-05-27
Payer: MEDICARE

## 2025-05-27 DIAGNOSIS — M17.11 UNILATERAL PRIMARY OSTEOARTHRITIS, RIGHT KNEE: ICD-10-CM

## 2025-05-27 PROCEDURE — 20610 DRAIN/INJ JOINT/BURSA W/O US: CPT | Mod: RT

## 2025-05-27 PROCEDURE — 99213 OFFICE O/P EST LOW 20 MIN: CPT | Mod: 25
